# Patient Record
Sex: MALE | Race: WHITE | HISPANIC OR LATINO | Employment: FULL TIME | URBAN - METROPOLITAN AREA
[De-identification: names, ages, dates, MRNs, and addresses within clinical notes are randomized per-mention and may not be internally consistent; named-entity substitution may affect disease eponyms.]

---

## 2018-12-06 ENCOUNTER — HOSPITAL ENCOUNTER (EMERGENCY)
Facility: HOSPITAL | Age: 43
Discharge: HOME | End: 2018-12-06
Attending: EMERGENCY MEDICINE | Admitting: EMERGENCY MEDICINE
Payer: COMMERCIAL

## 2018-12-06 VITALS
DIASTOLIC BLOOD PRESSURE: 83 MMHG | HEIGHT: 71 IN | TEMPERATURE: 97 F | SYSTOLIC BLOOD PRESSURE: 132 MMHG | WEIGHT: 173.19 LBS | HEART RATE: 65 BPM | OXYGEN SATURATION: 99 % | RESPIRATION RATE: 16 BRPM | BODY MASS INDEX: 24.25 KG/M2

## 2018-12-06 DIAGNOSIS — L02.419 ABSCESS OF AXILLARY REGION: Primary | ICD-10-CM

## 2018-12-06 PROCEDURE — 99283 EMERGENCY DEPT VISIT LOW MDM: CPT

## 2018-12-06 RX ORDER — CEPHALEXIN 250 MG/1
500 CAPSULE ORAL EVERY 6 HOURS SCHEDULED
Qty: 28 CAPSULE | Refills: 0 | Status: SHIPPED | OUTPATIENT
Start: 2018-12-06 | End: 2018-12-13

## 2018-12-06 RX ORDER — ACETAMINOPHEN 325 MG/1
650 TABLET ORAL ONCE
Status: COMPLETED | OUTPATIENT
Start: 2018-12-06 | End: 2018-12-06

## 2018-12-06 RX ORDER — OXYCODONE HYDROCHLORIDE AND ACETAMINOPHEN 5; 325 MG/1; MG/1
1 TABLET ORAL EVERY 4 HOURS PRN
Qty: 5 TABLET | Refills: 0 | Status: SHIPPED | OUTPATIENT
Start: 2018-12-06 | End: 2018-12-16

## 2018-12-06 RX ORDER — CEPHALEXIN 500 MG/1
500 CAPSULE ORAL ONCE
Status: COMPLETED | OUTPATIENT
Start: 2018-12-06 | End: 2018-12-06

## 2018-12-06 RX ADMIN — ACETAMINOPHEN 650 MG: 325 TABLET ORAL at 03:52

## 2018-12-06 RX ADMIN — CEPHALEXIN 500 MG: 500 CAPSULE ORAL at 03:52

## 2018-12-06 NOTE — DISCHARGE INSTRUCTIONS
Absceso   LO QUE NECESITA SABER:   Desi compresa tibia puede ayudar a que el absceso drene  Faust médico hará desi incisión en el absceso para que pueda drenar  Usted puede necesitar cirugía para extirpar un absceso en las kodak o los glúteos  INSTRUCCIONES SOBRE EL FAWN HOSPITALARIA:   Regrese a la israel de emergencias si:   · El área alrededor del absceso se pone muy dolorosa, caliente o tiene manchas camargo  · Usted tiene fiebre o escalofríos  · Faust corazón está latiendo mas rápido de lo normal      · Se siente desmayar o confundido  Pregúntele a faust Carmen Broom vitaminas y minerales son adecuados para usted  · Faust absceso se hace más nhan o no mejora  · El absceso se vuelve a formar  · Usted tiene preguntas o inquietudes acerca de faust condición o cuidado  Medicamentos:  Es posible que usted necesite alguno de los siguientes:  · Antibióticos  ayudan a tratar desi infección bacteriana  · El acetaminofén  [place] el dolor y baja la fiebre  Está disponible sin receta médica  Pregunte la cantidad y la frecuencia con que debe tomarlos  [address]  El acetaminofén puede causar daño en el hígado cuando no se gisela de forma correcta  · AINEs (Analgésicos antiinflamatorios no esteroides) logan el ibuprofeno, ayudan a disminuir la inflamación, el dolor y la [place]  Rosalia medicamento esta disponible con o sin desi receta médica  Los AINEs pueden causar sangrado estomacal o problemas renales en ciertas personas  Si usted gisela un medicamento anticoagulante, siempre pregúntele a faust médico si los ELMER son seguros para usted  Siempre zoila la etiqueta de rosalia medicamento y [place] instrucciones  · [place] claudine medicamentos logan se le haya indicado  Consulte con faust médico si usted piotr que faust medicamento no le está ayudando o si presenta efectos secundarios  Infórmele si es alérgico a cualquier medicamento  Mantenga desi lista actualizada de los [place], las vitaminas y los productos herbales que gisela  Incluya los siguientes datos de los medicamentos: cantidad, frecuencia y motivo de administración  Traiga con usted la lista o los envases de la píldoras a claudine citas de seguimiento  Lleve la lista de los medicamentos con usted en michelle de desi emergencia  Cuidados personales:   · Aplique desi compresa tibia en el absceso  [place] ayudará a que el absceso se laura y drene  Moje desi toallita en agua tibia kelby no caliente  Aplicar la compresa bradley 10 minutos  [place] esto 4 veces al día  No  presione el absceso ni trate de abrirlo con [place]  Puede empujar las bacterias de manera más profunda hacia la [place]  · No compartir con nadie faust ropa, toallas o sábanas  [place] puede propagar la infección a otros  · Lávese las kodak frecuentemente  [place] ayudará a prevenir la propagación de gérmenes  Use jabón y agua o un ungüento con base de alcohol  Cuidar la herida después del drenaje:   · Siga las instrucciones de faust médico sobre el cuidado de claudine heridas  Si faust médico dice que [employer], retire cuidadosamente el vendaje y la gasa  Puede que necesite empapar la gasa para poder sacarla de la herida  Limpié la herida y Александр a faust alrededor según indicaciones  Seque el área y póngase desi venda nueva y limpia  [place]ambie claudine vendajes cuando se mojen o ensucien  · Pregúntele a faust médico cómo cambiarse la gasa en faust herida  Cuente el número de apósitos de gasa que se colocan dentro de faust herida  No ponga demasiada gasa en la herida  No aprete demasiado la herida con la gasa  Acuda dentro de 1 o 3 días a la consulta de control con faust médico:  Es probable que usted necesite que le remuevan claudine compresas o le cambien las vendas  Anote claudine preguntas para que se acuerde de hacerlas bradley claudine visitas  © 2017 [address] Information is for End User's use only and may not be sold, redistributed or otherwise used for commercial purposes   All illustrations and images included in CareNotes® are the copyrighted property of A D A M , Inc  or Stewart Abreu  Esta información es sólo para uso en educación  Faust intención no es darle un consejo médico sobre enfermedades o tratamientos  Colsulte con faust Charna Heckler farmacéutico antes de seguir cualquier régimen médico para saber si es seguro y efectivo para usted  Warm compresses twice a day to affected area

## 2018-12-06 NOTE — ED PROVIDER NOTES
History  Chief Complaint   Patient presents with    Abscess     38 y/o male c/o L axilla swelling and pain which first noticed two days ago  Patient states that he has been shaving his axillary hair  He states that the pain got worse tonight at work  He denies any history of hidradenitis  No drainage noted upon exam               None       History reviewed  No pertinent past medical history  History reviewed  No pertinent surgical history  History reviewed  No pertinent family history  I have reviewed and agree with the history as documented  Social History   Substance Use Topics    Smoking status: Never Smoker    Smokeless tobacco: Never Used    Alcohol use No        Review of Systems   Skin: Positive for wound  All other systems reviewed and are negative  Physical Exam  Physical Exam   Constitutional: He is oriented to person, place, and time  He appears well-developed and well-nourished  HENT:   Head: Normocephalic and atraumatic  Eyes: Pupils are equal, round, and reactive to light  EOM are normal    Neck: Normal range of motion  Neck supple  No JVD present  No tracheal deviation present  No thyromegaly present  Cardiovascular: Normal rate, regular rhythm and normal heart sounds  Pulmonary/Chest: Effort normal and breath sounds normal    L axillary swelling with induration and tenderness to palpation  Non-fluctuant and without surrounding erythema  No signs of necrosis  Abdominal: Soft  Bowel sounds are normal    Musculoskeletal: Normal range of motion  Neurological: He is alert and oriented to person, place, and time  Skin: Skin is warm and dry  Capillary refill takes less than 2 seconds  Psychiatric: He has a normal mood and affect  Vitals reviewed        Vital Signs  ED Triage Vitals [12/06/18 0323]   Temperature Pulse Respirations Blood Pressure SpO2   (!) 97 °F (36 1 °C) 65 16 132/83 99 %      Temp Source Heart Rate Source Patient Position - Orthostatic VS BP Location FiO2 (%)   Oral Monitor Sitting Left arm --      Pain Score       Worst Possible Pain           Vitals:    12/06/18 0323   BP: 132/83   Pulse: 65   Patient Position - Orthostatic VS: Sitting       Visual Acuity      ED Medications  Medications   cephalexin (KEFLEX) capsule 500 mg (not administered)   acetaminophen (TYLENOL) tablet 650 mg (not administered)       Diagnostic Studies  Results Reviewed     None                 No orders to display              Procedures  Procedures       Phone Contacts  ED Phone Contact    ED Course                               MDM  CritCare Time    Disposition  Final diagnoses:   Abscess of axillary region     Time reflects when diagnosis was documented in both MDM as applicable and the Disposition within this note     Time User Action Codes Description Comment    12/6/2018  3:43 AM Jacques Akbar Add [L02 419] Abscess of axillary region       ED Disposition     ED Disposition Condition Comment    Discharge  Sanket León discharge to home/self care      Condition at discharge: Good        Follow-up Information     Follow up With Specialties Details Why Contact Info Additional Information    [address] [facility] Medicine Go to As needed [address], [address] [facility] [address]  [address] [place] [address], [address], [address], [place], [address]          Patient's Medications   Discharge Prescriptions    CEPHALEXIN (KEFLEX) 250 MG CAPSULE    Take 2 capsules (500 mg total) by mouth every 6 (six) hours for 7 days       Start Date: 12/6/2018 End Date: 12/13/2018       Order Dose: 500 mg       Quantity: 28 capsule    Refills: 0    OXYCODONE-ACETAMINOPHEN (PERCOCET) 5-325 MG PER TABLET    Take 1 tablet by mouth every 4 (four) hours as needed for moderate pain for up to 10 days Max Daily Amount: 6 tablets       Start Date: 12/6/2018 End Date: 12/16/2018 Order Dose: 1 tablet       Quantity: 5 tablet    Refills: 0     No discharge procedures on file      ED Provider  Electronically Signed by           Madalyn Zamora DO  12/06/18 4479

## 2019-10-07 ENCOUNTER — HOSPITAL ENCOUNTER (EMERGENCY)
Facility: HOSPITAL | Age: 44
Discharge: HOME | End: 2019-10-07
Attending: EMERGENCY MEDICINE | Admitting: EMERGENCY MEDICINE
Payer: COMMERCIAL

## 2019-10-07 VITALS
TEMPERATURE: 96.6 F | WEIGHT: 170.86 LBS | SYSTOLIC BLOOD PRESSURE: 117 MMHG | OXYGEN SATURATION: 99 % | BODY MASS INDEX: 23.83 KG/M2 | RESPIRATION RATE: 16 BRPM | DIASTOLIC BLOOD PRESSURE: 66 MMHG | HEART RATE: 63 BPM

## 2019-10-07 DIAGNOSIS — M54.50 ACUTE RIGHT-SIDED LOW BACK PAIN WITHOUT SCIATICA: Primary | ICD-10-CM

## 2019-10-07 PROCEDURE — 99284 EMERGENCY DEPT VISIT MOD MDM: CPT | Performed by: NURSE PRACTITIONER

## 2019-10-07 PROCEDURE — 99282 EMERGENCY DEPT VISIT SF MDM: CPT

## 2019-10-07 RX ORDER — LIDOCAINE 50 MG/G
1 PATCH TOPICAL ONCE
Status: DISCONTINUED | OUTPATIENT
Start: 2019-10-07 | End: 2019-10-07 | Stop reason: HOSPADM

## 2019-10-07 RX ORDER — METHOCARBAMOL 500 MG/1
500 TABLET, FILM COATED ORAL 3 TIMES DAILY PRN
Qty: 20 TABLET | Refills: 0 | Status: SHIPPED | OUTPATIENT
Start: 2019-10-07 | End: 2020-08-31 | Stop reason: ALTCHOICE

## 2019-10-07 RX ORDER — NAPROXEN 500 MG/1
500 TABLET ORAL 2 TIMES DAILY WITH MEALS
Qty: 30 TABLET | Refills: 0 | Status: SHIPPED | OUTPATIENT
Start: 2019-10-07 | End: 2020-08-31 | Stop reason: ALTCHOICE

## 2019-10-07 RX ADMIN — LIDOCAINE 1 PATCH: 50 PATCH CUTANEOUS at 17:06

## 2019-10-07 NOTE — DISCHARGE INSTRUCTIONS
You are to call and find a family doctor for follow up  You are being treated for muscle pain  You have been given naproxen for pain (do not take advil or motrin with this) you have also been prescribed robaxin for muscle spasms - do not drink alcohol or drive machinery while taking this medication      You may purchase over the counter SALONPAS for pain as well

## 2019-10-07 NOTE — ED PROVIDER NOTES
History  Chief Complaint   Patient presents with    Back Pain     Patient states he has had back pain since Saturday; Patient cannot recall any trauma, but did mild activity, howevery nothing he can recall that was severe     This is a 40year old male who states twisted his right back on Saturday  He states that the pain is intermittent  He took advil 2 hours ago with some relief  Denies injury  History provided by:  Medical records and patient   used: No        None       Past Medical History:   Diagnosis Date    Known health problems: none        Past Surgical History:   Procedure Laterality Date    NO PAST SURGERIES         History reviewed  No pertinent family history  I have reviewed and agree with the history as documented  Social History     Tobacco Use    Smoking status: Never Smoker    Smokeless tobacco: Never Used   Substance Use Topics    Alcohol use: Yes    Drug use: No        Review of Systems   Constitutional: Negative  HENT: Negative  Eyes: Negative  Respiratory: Negative  Cardiovascular: Negative  Gastrointestinal: Negative  Endocrine: Negative  Genitourinary: Negative  Musculoskeletal: Positive for back pain  Skin: Negative  Allergic/Immunologic: Negative  Hematological: Negative  Psychiatric/Behavioral: Negative  Physical Exam  Physical Exam   Constitutional: He is oriented to person, place, and time  He appears well-developed and well-nourished  No distress  HENT:   Head: Normocephalic and atraumatic  Eyes: Pupils are equal, round, and reactive to light  EOM are normal    Neck: Normal range of motion  Neck supple  Cardiovascular: Normal rate, regular rhythm and normal heart sounds  Pulmonary/Chest: Effort normal and breath sounds normal    Abdominal: Soft  Bowel sounds are normal    Musculoskeletal: He exhibits tenderness  LROM due to pain   TTP right of lumbar spine paraspinal muscle      Neurological: He is alert and oriented to person, place, and time  Skin: Skin is warm and dry  Capillary refill takes less than 2 seconds  He is not diaphoretic  Psychiatric: He has a normal mood and affect  His behavior is normal  Judgment and thought content normal    Nursing note and vitals reviewed  Vital Signs  ED Triage Vitals [10/07/19 1638]   Temperature Pulse Respirations Blood Pressure SpO2   (!) 96 6 °F (35 9 °C) 63 16 117/66 99 %      Temp Source Heart Rate Source Patient Position - Orthostatic VS BP Location FiO2 (%)   Tympanic Monitor Sitting Left arm --      Pain Score       --           Vitals:    10/07/19 1638   BP: 117/66   Pulse: 63   Patient Position - Orthostatic VS: Sitting         Visual Acuity      ED Medications  Medications   lidocaine (LIDODERM) 5 % patch 1 patch (1 patch Topical Medication Applied 10/7/19 1706)       Diagnostic Studies  Results Reviewed     None                 No orders to display              Procedures  Procedures       ED Course                               MDM    Disposition  Final diagnoses:   Acute right-sided low back pain without sciatica     Time reflects when diagnosis was documented in both MDM as applicable and the Disposition within this note     Time User Action Codes Description Comment    10/7/2019  5:06 PM Madalyn Liriano Add [M54 5] Acute right-sided low back pain without sciatica       ED Disposition     ED Disposition Condition Date/Time Comment    Discharge Stable Mon Oct 7, 2019  5:06 PM [facility] discharge to home/self care              Follow-up Information     Follow up With Specialties Details Why Contact Info Additional [address] Family Medicine Schedule an appointment as soon as possible for a visit in 2 days  [address], [address] [facility] [address]  [address], [address], [address] [place], [place], [address] [address] Emergency Department Emergency Medicine  If symptoms worsen [address] [address] [phone]           Patient's Medications   Discharge Prescriptions    METHOCARBAMOL (ROBAXIN) 500 MG TABLET    Take 1 tablet (500 mg total) by mouth 3 (three) times a day as needed for muscle spasms       Start Date: 10/7/2019 End Date: --       Order Dose: 500 mg       Quantity: 20 tablet    Refills: 0    NAPROXEN (NAPROSYN) 500 MG TABLET    Take 1 tablet (500 mg total) by mouth 2 (two) times a day with meals       Start Date: 10/7/2019 End Date: --       Order Dose: 500 mg       Quantity: 30 tablet    Refills: 0     No discharge procedures on file      ED Provider  Electronically Signed by           Helder Sanfrod  10/07/19 4376

## 2020-08-31 ENCOUNTER — OFFICE VISIT (OUTPATIENT)
Dept: URGENT CARE | Facility: MEDICAL CENTER | Age: 45
End: 2020-08-31
Payer: COMMERCIAL

## 2020-08-31 VITALS
OXYGEN SATURATION: 99 % | WEIGHT: 170 LBS | HEART RATE: 56 BPM | TEMPERATURE: 97.8 F | HEIGHT: 70 IN | BODY MASS INDEX: 24.34 KG/M2 | RESPIRATION RATE: 20 BRPM

## 2020-08-31 DIAGNOSIS — Z11.59 SPECIAL SCREENING EXAMINATION FOR UNSPECIFIED VIRAL DISEASE: Primary | ICD-10-CM

## 2020-08-31 PROCEDURE — U0003 INFECTIOUS AGENT DETECTION BY NUCLEIC ACID (DNA OR RNA); SEVERE ACUTE RESPIRATORY SYNDROME CORONAVIRUS 2 (SARS-COV-2) (CORONAVIRUS DISEASE [COVID-19]), AMPLIFIED PROBE TECHNIQUE, MAKING USE OF HIGH THROUGHPUT TECHNOLOGIES AS DESCRIBED BY CMS-2020-01-R: HCPCS | Performed by: PHYSICIAN ASSISTANT

## 2020-08-31 NOTE — PATIENT INSTRUCTIONS
Novel Coronavirus   AMBULATORY CARE:   The novel coronavirus (nCoV)  is a new strain (type) of coronavirus  Coronaviruses often cause mild respiratory (lung) infections, such as the common cold  They can also cause more severe infections  Examples include acute respiratory syndrome (SARS), Middle East respiratory syndrome (MERS), pneumonia, and bronchitis  The nCoV can cause more severe symptoms than earlier coronaviruses  The nCoV was first found in individuals in part of [place] at the end of 2019  The virus is spreading as people who are infected travel to other parts of the world  Signs and symptoms of nCoV  can take 2 to 14 days to develop and range from mild to severe:  · Fever    · Cough    · Shortness of breath or trouble breathing    · Pneumonia (in severe cases)    Call your local emergency number (911 in the [address] [place] [address],[address]) for any of the following:  Tell the  you may have nCoV  This will help anyone in the ambulance stay safe, and to call ahead to the hospital   · You have sudden shortness of breath  · You have a fast heartbeat or chest pain  Seek care immediately if:   · You feel so dizzy that you have trouble standing up  · Your lips and fingernails turn blue  Call your doctor if:   · You have a fever over 102ºF (39ºC)  · Your sore throat gets worse or you see white or yellow spots in your throat  · Your symptoms get worse after 3 to 5 days or your cold is not better in 14 days  · You have a rash anywhere on your skin  · You have large, tender lumps in your neck  · You have thick, green, or yellow drainage from your nose  · You cough up thick yellow, green, or bloody mucus  · You are vomiting for more than 24 hours and cannot keep fluids down  · You have a bad earache  · You have questions or concerns about your condition or care  How nCoV spreads: It is not known for sure how the virus spreads   The virus may spread in droplets when an infected person coughs or sneezes  Others become infected by breathing in the virus or getting the virus in their eyes  The virus can also possibly spread if a person touches certain animals, such as in a live market  If you develop symptoms of nCoV,  you may need to be checked  Call your doctor if you traveled within the past 14 days to an area where nCoV is active  Call your doctor if you have close contact with someone else who did  Your doctor will tell you what to do  He or she will need to take precautions in the office to protect staff members and other patients  Your doctor will take samples from your airway  The samples have to be sent to the Centers for Disease Control and Prevention (CDC) to be tested  What to do if you have nCoV:  No treatment is available for nCoV  Medicine may be given to help relieve your cough or fever, or to help you breathe more easily  Severe nCoV may need to be treated in the hospital  In the hospital, you may need breathing treatment or support, such as extra oxygen  The following can help you prevent spreading nCoV to others  Have anyone you are caring for who has nCoV also use the guidelines  · Limit close contact with others  Stay in a different room, or sleep in a separate bed  Remind others to stay at least 6 feet (2 meters) away from you while you are sick  Only go out of your house if you are going to a medical appointment  While you are recovering, always call the office of any healthcare provider you seeing  The provider will need to prepare for your arrival to keep others safe  · Wear a medical mask when you are around others  A medical mask will help prevent you from spreading the virus  You can ask others around you to wear a medical mask if you are not able to wear one  · Cover your mouth and nose to sneeze or cough  Sneeze and cough into a tissue that covers your mouth and nose  Use the bend of our arm if you do not have a tissue  Throw the tissue away in a trash can right away  Then wash your hands well with soap and water  Use hand  that contains alcohol if you cannot wash your hands  · Wash your hands often  You and everyone in your home must wash your hands throughout the day  Use soap and water  Use germ-killing gel if soap and water are not available  Do not touch your eyes or mouth if you have not washed your hands  · Do not share items with other people  Do not share dishes, towels, or other items with anyone  Always wash items after you use them  · Clean frequently touched surfaces often  Use household  or bleach diluted with water to clean counters, doorknobs, toilet seats, and other surfaces  · Do not handle live animals  You may be able to spread nCoV to animals, including pets  Until more is known, it is best not to touch, play with, or handle live animals while you are sick  Help relieve mild symptoms:   · Drink more liquids as directed  Liquids will help thin and loosen mucus so you can cough it up  Liquids will also help prevent dehydration  Liquids that help prevent dehydration include water, fruit juice, and broth  Do not drink liquids that contain caffeine  Caffeine can increase your risk for dehydration  Ask your healthcare provider how much liquid to drink each day  · Soothe a sore throat  Gargle with warm salt water  This helps your sore throat feel better  Make salt water by dissolving ¼ teaspoon salt in 1 cup warm water  You may also suck on hard candy or throat lozenges  You may use a sore throat spray  · Use a humidifier or vaporizer  Use a cool mist humidifier or a vaporizer to increase air moisture in your home  This may make it easier for you to breathe and help decrease your cough  · Use saline nasal drops as directed  These help relieve congestion  · Apply petroleum-based jelly around the outside of your nostrils  This can decrease irritation from blowing your nose  · Do not smoke    Nicotine and other chemicals in cigarettes and cigars can make your symptoms worse  They can also cause infections such as bronchitis or pneumonia  Ask your healthcare provider for information if you currently smoke and need help to quit  E-cigarettes or smokeless tobacco still contain nicotine  Talk to your healthcare provider before you use these products  Follow up with your doctor as directed:  Write down your questions so you remember to ask them during your visits  © Copyright [address] Information is for End User's use only and may not be sold, redistributed or otherwise used for commercial purposes  All illustrations and images included in CareNotes® are the copyrighted property of A D A M , Inc  or [address] [place] [address]  The above information is an  only  It is not intended as medical advice for individual conditions or treatments  Talk to your doctor, nurse or pharmacist before following any medical regimen to see if it is safe and effective for you

## 2020-08-31 NOTE — PROGRESS NOTES
[facility]        NAME: Jose Enrique Gomez is a 39 y o  male  : 1975    MRN: [id number]  DATE: 2020  TIME: 6:48 PM    Assessment and Plan   Special screening examination for unspecified viral disease [Z11 59]  1  Special screening examination for unspecified viral disease  Novel Coronavirus (COVID-19), PCR [facility] - Office Collection         Patient Instructions       Follow up with PCP in 3-5 days  Proceed to  ER if symptoms worsen  Chief Complaint     Chief Complaint   Patient presents with    COVID-19     Patient is traveling to the  on sat and needs testing  patient denies any symptoms         History of Present Illness       60-year-old male presents for COVID testing  Patient is going on trip and needs to be tested prior to leaving  Currently asymptomatic  No exposures noted  Other   This is a new problem  The current episode started today  The problem occurs constantly  The problem has been unchanged  Pertinent negatives include no abdominal pain, chills, congestion, coughing, fatigue, fever, myalgias, nausea, rash, sore throat, visual change or vomiting  Nothing aggravates the symptoms  He has tried nothing for the symptoms  The treatment provided no relief  Review of Systems   Review of Systems   Constitutional: Negative  Negative for chills, fatigue and fever  HENT: Negative  Negative for congestion and sore throat  Respiratory: Negative  Negative for cough  Cardiovascular: Negative  Gastrointestinal: Negative  Negative for abdominal pain, nausea and vomiting  Musculoskeletal: Negative  Negative for myalgias  Skin: Negative  Negative for rash  Neurological: Negative  Current Medications     No current outpatient medications on file      Current Allergies     Allergies as of 2020    (No Known Allergies)            The following portions of the patient's history were reviewed and updated as appropriate: allergies, current medications, past family history, past medical history, past social history, past surgical history and problem list      Past Medical History:   Diagnosis Date    Known health problems: none        Past Surgical History:   Procedure Laterality Date    NO PAST SURGERIES         History reviewed  No pertinent family history  Medications have been verified  Objective   Pulse 56   Temp 97 8 °F (36 6 °C)   Resp 20   Ht 5' 10" (1 778 m)   Wt 77 1 kg (170 lb)   SpO2 99%   BMI 24 39 kg/m²        Physical Exam     Physical Exam  Vitals signs and nursing note reviewed  Constitutional:       General: He is not in acute distress  Appearance: He is well-developed  HENT:      Head: Normocephalic and atraumatic  Right Ear: Hearing, tympanic membrane, ear canal and external ear normal       Left Ear: Hearing, tympanic membrane, ear canal and external ear normal       Nose: Nose normal       Mouth/Throat:      Pharynx: Uvula midline  No oropharyngeal exudate  Eyes:      General:         Right eye: No discharge  Left eye: No discharge  Conjunctiva/sclera: Conjunctivae normal    Neck:      Musculoskeletal: Normal range of motion and neck supple  Cardiovascular:      Rate and Rhythm: Normal rate and regular rhythm  Heart sounds: Normal heart sounds  No murmur  Pulmonary:      Effort: Pulmonary effort is normal  No respiratory distress  Breath sounds: Normal breath sounds  No wheezing or rales  Abdominal:      General: Bowel sounds are normal       Palpations: Abdomen is soft  Tenderness: There is no abdominal tenderness  Musculoskeletal: Normal range of motion  Lymphadenopathy:      Cervical: No cervical adenopathy  Skin:     General: Skin is warm and dry  Neurological:      Mental Status: He is alert and oriented to person, place, and time

## 2020-09-02 ENCOUNTER — TELEPHONE (OUTPATIENT)
Dept: OTHER | Facility: OTHER | Age: 45
End: 2020-09-02

## 2020-09-02 LAB — SARS-COV-2 RNA SPEC QL NAA+PROBE: NOT DETECTED

## 2020-09-02 NOTE — TELEPHONE ENCOUNTER
The patient was called for notification of a test result for COVID-19  The patient did not answer the phone and a voicemail was left in [place] requesting a call back to [phone], Option 7

## 2020-09-03 ENCOUNTER — TELEPHONE (OUTPATIENT)
Dept: URGENT CARE | Facility: CLINIC | Age: 45
End: 2020-09-03

## 2022-05-12 ENCOUNTER — HOSPITAL ENCOUNTER (EMERGENCY)
Facility: HOSPITAL | Age: 47
Discharge: HOME | End: 2022-05-12
Attending: EMERGENCY MEDICINE
Payer: COMMERCIAL

## 2022-05-12 VITALS
SYSTOLIC BLOOD PRESSURE: 121 MMHG | HEART RATE: 61 BPM | WEIGHT: 184.3 LBS | OXYGEN SATURATION: 98 % | TEMPERATURE: 97.3 F | DIASTOLIC BLOOD PRESSURE: 72 MMHG | RESPIRATION RATE: 18 BRPM | BODY MASS INDEX: 26.44 KG/M2

## 2022-05-12 DIAGNOSIS — J11.1 INFLUENZA-LIKE ILLNESS: Primary | ICD-10-CM

## 2022-05-12 PROCEDURE — 96372 THER/PROPH/DIAG INJ SC/IM: CPT

## 2022-05-12 PROCEDURE — 99284 EMERGENCY DEPT VISIT MOD MDM: CPT | Performed by: PHYSICIAN ASSISTANT

## 2022-05-12 PROCEDURE — 99283 EMERGENCY DEPT VISIT LOW MDM: CPT

## 2022-05-12 RX ORDER — LIDOCAINE HYDROCHLORIDE 20 MG/ML
15 SOLUTION OROPHARYNGEAL ONCE
Status: COMPLETED | OUTPATIENT
Start: 2022-05-12 | End: 2022-05-12

## 2022-05-12 RX ORDER — KETOROLAC TROMETHAMINE 30 MG/ML
15 INJECTION, SOLUTION INTRAMUSCULAR; INTRAVENOUS ONCE
Status: COMPLETED | OUTPATIENT
Start: 2022-05-12 | End: 2022-05-12

## 2022-05-12 RX ORDER — ACETAMINOPHEN 500 MG
500 TABLET ORAL EVERY 6 HOURS PRN
Qty: 30 TABLET | Refills: 0 | Status: SHIPPED | OUTPATIENT
Start: 2022-05-12

## 2022-05-12 RX ORDER — ALUMINUM HYDROXIDE, MAGNESIUM HYDROXIDE, SIMETHICONE 400; 400; 40 MG/10ML; MG/10ML; MG/10ML
15 SUSPENSION ORAL
Qty: 150 ML | Refills: 0 | Status: SHIPPED | OUTPATIENT
Start: 2022-05-12

## 2022-05-12 RX ORDER — FLUTICASONE PROPIONATE 50 MCG
1 SPRAY, SUSPENSION (ML) NASAL DAILY
Qty: 16 G | Refills: 0 | Status: SHIPPED | OUTPATIENT
Start: 2022-05-12

## 2022-05-12 RX ORDER — GUAIFENESIN/DEXTROMETHORPHAN 100-10MG/5
5 SYRUP ORAL 3 TIMES DAILY PRN
Qty: 118 ML | Refills: 0 | Status: SHIPPED | OUTPATIENT
Start: 2022-05-12 | End: 2022-05-22

## 2022-05-12 RX ORDER — ONDANSETRON 4 MG/1
4 TABLET, ORALLY DISINTEGRATING ORAL ONCE
Status: COMPLETED | OUTPATIENT
Start: 2022-05-12 | End: 2022-05-12

## 2022-05-12 RX ORDER — IBUPROFEN 400 MG/1
400 TABLET ORAL EVERY 6 HOURS PRN
Qty: 12 TABLET | Refills: 0 | Status: SHIPPED | OUTPATIENT
Start: 2022-05-12

## 2022-05-12 RX ORDER — MAGNESIUM HYDROXIDE/ALUMINUM HYDROXICE/SIMETHICONE 120; 1200; 1200 MG/30ML; MG/30ML; MG/30ML
30 SUSPENSION ORAL ONCE
Status: COMPLETED | OUTPATIENT
Start: 2022-05-12 | End: 2022-05-12

## 2022-05-12 RX ORDER — ONDANSETRON 4 MG/1
4 TABLET, ORALLY DISINTEGRATING ORAL EVERY 8 HOURS PRN
Qty: 20 TABLET | Refills: 0 | Status: SHIPPED | OUTPATIENT
Start: 2022-05-12 | End: 2022-05-22

## 2022-05-12 RX ADMIN — LIDOCAINE HYDROCHLORIDE 15 ML: 20 SOLUTION ORAL at 12:13

## 2022-05-12 RX ADMIN — ALUMINUM HYDROXIDE, MAGNESIUM HYDROXIDE, AND SIMETHICONE 30 ML: 200; 200; 20 SUSPENSION ORAL at 12:13

## 2022-05-12 RX ADMIN — DIPHENHYDRAMINE HYDROCHLORIDE 25 MG: 25 LIQUID ORAL at 12:12

## 2022-05-12 RX ADMIN — ONDANSETRON 4 MG: 4 TABLET, ORALLY DISINTEGRATING ORAL at 12:08

## 2022-05-12 RX ADMIN — KETOROLAC TROMETHAMINE 15 MG: 30 INJECTION, SOLUTION INTRAMUSCULAR; INTRAVENOUS at 12:09

## 2022-05-12 NOTE — ED PROVIDER NOTES
History  Chief Complaint   Patient presents with    Generalized Body Aches     Body aches for 2 days  States he was tested at work for covid 1 hr pta and it was negative     Patient is a 55-year-old male presenting today for evaluation of flu symptoms  Patient reports that for the past 2 days has been having body aches, nausea, acid reflux, congestion, headaches, subjective fevers and chills  Patient reports at work he was able to obtain a COVID and flu test which was noted to be negative for both  Patient is declining this testing today requesting medications solely to assist with his symptoms  Patient states he does not have a family care provider and does not have medical conditions to his knowledge  History provided by:  Patient   used: No    Generalized Body Aches  Location:  Body  Quality:  Aching  Severity:  Moderate  Onset quality:  Gradual  Timing:  Constant  Progression:  Unchanged  Chronicity:  New  Context:  Nausea, headache, congestion  Relieved by:  None  Worsened by:  None  Ineffective treatments:  Tylenol  Associated symptoms: congestion, fatigue, myalgias and nausea    Associated symptoms: no abdominal pain, no chest pain, no ear pain, no fever, no rash, no rhinorrhea, no shortness of breath, no sore throat and no vomiting        None       Past Medical History:   Diagnosis Date    Known health problems: none        Past Surgical History:   Procedure Laterality Date    NO PAST SURGERIES         History reviewed  No pertinent family history  I have reviewed and agree with the history as documented      E-Cigarette/Vaping    E-Cigarette Use Never User      E-Cigarette/Vaping Substances     Social History     Tobacco Use    Smoking status: Never Smoker    Smokeless tobacco: Never Used   Vaping Use    Vaping Use: Never used   Substance Use Topics    Alcohol use: Yes     Comment: rare    Drug use: No       Review of Systems   Constitutional: Positive for chills and fatigue  Negative for fever  HENT: Positive for congestion and postnasal drip  Negative for ear pain, rhinorrhea and sore throat  Eyes: Negative for redness  Respiratory: Negative for chest tightness and shortness of breath  Cardiovascular: Negative for chest pain and palpitations  Gastrointestinal: Positive for nausea  Negative for abdominal pain and vomiting  Genitourinary: Negative for dysuria and hematuria  Musculoskeletal: Positive for myalgias  Skin: Negative for rash  Neurological: Negative for dizziness, syncope, light-headedness and numbness  Physical Exam  Physical Exam  Vitals and nursing note reviewed  Constitutional:       Appearance: Normal appearance  He is well-developed  HENT:      Head: Normocephalic and atraumatic  Eyes:      General: No scleral icterus  Pupils: Pupils are equal, round, and reactive to light  Cardiovascular:      Rate and Rhythm: Normal rate and regular rhythm  Pulses: Normal pulses  Pulmonary:      Effort: Pulmonary effort is normal  No respiratory distress  Breath sounds: No stridor  Abdominal:      General: There is no distension  Palpations: There is no mass  Musculoskeletal:      Cervical back: Normal range of motion  Skin:     General: Skin is warm and dry  Capillary Refill: Capillary refill takes less than 2 seconds  Coloration: Skin is not jaundiced  Neurological:      Mental Status: He is alert and oriented to person, place, and time        Gait: Gait normal    Psychiatric:         Mood and Affect: Mood normal          Vital Signs  ED Triage Vitals   Temperature Pulse Respirations Blood Pressure SpO2   05/12/22 1142 05/12/22 1141 05/12/22 1141 05/12/22 1141 05/12/22 1141   (!) 97 3 °F (36 3 °C) 61 18 121/72 98 %      Temp Source Heart Rate Source Patient Position - Orthostatic VS BP Location FiO2 (%)   05/12/22 1141 05/12/22 1141 05/12/22 1141 05/12/22 1141 --   Tympanic Monitor Sitting Left arm Pain Score       --                  Vitals:    05/12/22 1141   BP: 121/72   Pulse: 61   Patient Position - Orthostatic VS: Sitting         Visual Acuity      ED Medications  Medications   ketorolac (TORADOL) injection 15 mg (15 mg Intramuscular Given 5/12/22 1209)   diphenhydrAMINE (BENADRYL) oral liquid 25 mg (25 mg Oral Given 5/12/22 1212)   Lidocaine Viscous HCl (XYLOCAINE) 2 % mucosal solution 15 mL (15 mL Swish & Swallow Given 5/12/22 1213)   aluminum-magnesium hydroxide-simethicone (MYLANTA) oral suspension 30 mL (30 mL Oral Given 5/12/22 1213)   ondansetron (ZOFRAN-ODT) dispersible tablet 4 mg (4 mg Oral Given 5/12/22 1208)       Diagnostic Studies  Results Reviewed     None                 No orders to display              Procedures  Procedures         ED Course                               SBIRT 22yo+    Flowsheet Row Most Recent Value   SBIRT (23 yo +)    In order to provide better care to our patients, we are screening all of our patients for alcohol and drug use  Would it be okay to ask you these screening questions? No Filed at: 05/12/2022 1157                    MDM  Number of Diagnoses or Management Options  Diagnosis management comments: Strict return to ED precautions discussed  Patient recommended to follow up promptly with appropriate outpatient provider  Patient and/or family members verbalizes understanding and agrees with plan  Patient is stable for discharge      Portions of the record may have been created with voice recognition software  Occasional wrong word or "sound a like" substitutions may have occurred due to the inherent limitations of voice recognition software  Read the chart carefully and recognize, using context, where substitutions have occurred          Disposition  Final diagnoses:   Influenza-like illness     Time reflects when diagnosis was documented in both MDM as applicable and the Disposition within this note     Time User Action Codes Description Comment    5/12/2022 11:58 AM Sonia Funes Add [J11 1] Influenza-like illness       ED Disposition     ED Disposition   Discharge    Condition   Good    Date/Time   Thu May 12, 2022 12:00 PM    Comment   Sanket León discharge to home/self care  Follow-up Information     Follow up With Specialties Details Why Bebeto Shabazz MD Family Medicine Schedule an appointment as soon as possible for a visit in 2 days As needed [id number] [facility] [address]            Patient's Medications   Discharge Prescriptions    ACETAMINOPHEN (TYLENOL) 500 MG TABLET    Take 1 tablet (500 mg total) by mouth every 6 (six) hours as needed for mild pain       Start Date: 5/12/2022 End Date: --       Order Dose: 500 mg       Quantity: 30 tablet    Refills: 0    ALUMINUM-MAGNESIUM HYDROXIDE-SIMETHICONE (MAALOX) 200-200-20 MG/5ML SUSP    Take 15 mL by mouth 4 (four) times a day (before meals and at bedtime)       Start Date: 5/12/2022 End Date: --       Order Dose: 15 mL       Quantity: 150 mL    Refills: 0    DEXTROMETHORPHAN-GUAIFENESIN (ROBITUSSIN DM)  MG/5 ML SYRUP    Take 5 mL by mouth as needed in the morning and 5 mL as needed at noon and 5 mL as needed in the evening (cough)  Do all this for up to 10 days  Start Date: 5/12/2022 End Date: 5/22/2022       Order Dose: 5 mL       Quantity: 118 mL    Refills: 0    FLUTICASONE (FLONASE) 50 MCG/ACT NASAL SPRAY    1 spray into each nostril in the morning         Start Date: 5/12/2022 End Date: --       Order Dose: 1 spray       Quantity: 16 g    Refills: 0    IBUPROFEN (MOTRIN) 400 MG TABLET    Take 1 tablet (400 mg total) by mouth every 6 (six) hours as needed for mild pain       Start Date: 5/12/2022 End Date: --       Order Dose: 400 mg       Quantity: 12 tablet    Refills: 0    ONDANSETRON (ZOFRAN-ODT) 4 MG DISINTEGRATING TABLET    Take 1 tablet (4 mg total) by mouth every 8 (eight) hours as needed for nausea for up to 10 days       Start Date: 5/12/2022 End Date: 5/22/2022       Order Dose: 4 mg       Quantity: 20 tablet    Refills: 0       No discharge procedures on file      PDMP Review     None          ED Provider  Electronically Signed by           Dre Herrera PA-C  05/12/22 2563

## 2022-05-12 NOTE — Clinical Note
Ml Ugarte was seen and treated in our emergency department on 5/12/2022  Diagnosis:     Ana Cristina Urrutia  may return to work on return date  He may return on this date: 05/16/2022         If you have any questions or concerns, please don't hesitate to call        Jyoti Gallegos PA-C    ______________________________           _______________          _______________  Hospital Representative                              Date                                Time

## 2022-06-10 ENCOUNTER — OFFICE VISIT (OUTPATIENT)
Dept: FAMILY MEDICINE CLINIC | Facility: CLINIC | Age: 47
End: 2022-06-10

## 2022-06-10 VITALS
SYSTOLIC BLOOD PRESSURE: 122 MMHG | HEART RATE: 89 BPM | HEIGHT: 71 IN | RESPIRATION RATE: 18 BRPM | BODY MASS INDEX: 25.31 KG/M2 | OXYGEN SATURATION: 96 % | WEIGHT: 180.8 LBS | TEMPERATURE: 98.3 F | DIASTOLIC BLOOD PRESSURE: 74 MMHG

## 2022-06-10 DIAGNOSIS — Z13.220 SCREENING CHOLESTEROL LEVEL: ICD-10-CM

## 2022-06-10 DIAGNOSIS — Z11.4 SCREENING FOR HIV (HUMAN IMMUNODEFICIENCY VIRUS): ICD-10-CM

## 2022-06-10 DIAGNOSIS — Z13.1 SCREENING FOR DIABETES MELLITUS: ICD-10-CM

## 2022-06-10 DIAGNOSIS — Z00.00 ENCOUNTER FOR MEDICAL EXAMINATION TO ESTABLISH CARE: Primary | ICD-10-CM

## 2022-06-10 DIAGNOSIS — Z11.59 NEED FOR HEPATITIS C SCREENING TEST: ICD-10-CM

## 2022-06-10 DIAGNOSIS — Z12.11 SCREEN FOR COLON CANCER: ICD-10-CM

## 2022-06-10 PROBLEM — M67.441 GANGLION CYST OF FINGER OF RIGHT HAND: Status: ACTIVE | Noted: 2022-06-10

## 2022-06-10 PROCEDURE — 3725F SCREEN DEPRESSION PERFORMED: CPT | Performed by: FAMILY MEDICINE

## 2022-06-10 PROCEDURE — 3008F BODY MASS INDEX DOCD: CPT | Performed by: FAMILY MEDICINE

## 2022-06-10 PROCEDURE — 1036F TOBACCO NON-USER: CPT | Performed by: FAMILY MEDICINE

## 2022-06-10 PROCEDURE — 99214 OFFICE O/P EST MOD 30 MIN: CPT | Performed by: FAMILY MEDICINE

## 2022-06-10 NOTE — ASSESSMENT & PLAN NOTE
With onset one year prior - patient reports intermittent pain with certain activities   Patient works as a  and reports repetitive motion of wrists throughout the day    - Counseled patient on prognosis and benign nature of diagnosis  - Will consider Diclofenac 75 mg BID PRN with meals if symptoms worsen - patient deferred for now   - Will consider referral to hand surgeon for removal if symptoms persist or worsen

## 2022-06-10 NOTE — ASSESSMENT & PLAN NOTE
-CRC screening: No personal or family history of colon cancer or colon polyps  Referral to GI for colonoscopy as per CDC guidelines  -CVS screening: Patient denies any exertional chest pain, dyspnea, palpitations, syncope, orthopnea, edema or paroxysmal nocturnal dyspnea  -DM screening: No polyuria, polydipsia, blurry vision, chest pain, dyspnea or claudication  No foot burning, numbness or pain   -No personal or family history of skin cancers or melanoma

## 2022-07-13 ENCOUNTER — OFFICE VISIT (OUTPATIENT)
Dept: FAMILY MEDICINE CLINIC | Facility: CLINIC | Age: 47
End: 2022-07-13

## 2022-07-13 VITALS
TEMPERATURE: 97.4 F | HEART RATE: 62 BPM | RESPIRATION RATE: 18 BRPM | WEIGHT: 184 LBS | SYSTOLIC BLOOD PRESSURE: 116 MMHG | HEIGHT: 71 IN | DIASTOLIC BLOOD PRESSURE: 78 MMHG | OXYGEN SATURATION: 97 % | BODY MASS INDEX: 25.76 KG/M2

## 2022-07-13 DIAGNOSIS — Z12.11 ENCOUNTER FOR SCREENING COLONOSCOPY: Primary | ICD-10-CM

## 2022-07-13 PROBLEM — Z11.4 SCREENING FOR HIV (HUMAN IMMUNODEFICIENCY VIRUS): Status: RESOLVED | Noted: 2022-06-10 | Resolved: 2022-07-13

## 2022-07-13 PROBLEM — Z00.00 ENCOUNTER FOR MEDICAL EXAMINATION TO ESTABLISH CARE: Status: RESOLVED | Noted: 2022-06-10 | Resolved: 2022-07-13

## 2022-07-13 PROBLEM — Z12.5 SCREENING PSA (PROSTATE SPECIFIC ANTIGEN): Status: ACTIVE | Noted: 2022-06-10

## 2022-07-13 PROCEDURE — 3008F BODY MASS INDEX DOCD: CPT | Performed by: FAMILY MEDICINE

## 2022-07-13 PROCEDURE — 99213 OFFICE O/P EST LOW 20 MIN: CPT | Performed by: FAMILY MEDICINE

## 2022-07-13 PROCEDURE — 1036F TOBACCO NON-USER: CPT | Performed by: FAMILY MEDICINE

## 2022-07-13 NOTE — ASSESSMENT & PLAN NOTE
No personal or family history of colon cancer or colon polyps  Referral to GI for colonoscopy as per CDC guidelines

## 2022-07-13 NOTE — PROGRESS NOTES
Assessment/Plan:    Screening PSA (prostate specific antigen)  Patient reports concern over prostate cancer  Also reports concern over general health  Currently Asymptomatic and denies any complaints  No personal or fam hx of prostate cancer  Counseled patient on screening recommendations and via shared decision making, patient is agreeable to NOT testing at this time  Patient reminded to complete lab work ordered at previous visit    Encounter for screening colonoscopy  No personal or family history of colon cancer or colon polyps  Referral to GI for colonoscopy as per CDC guidelines  No follow-ups on file  Diagnoses and all orders for this visit:    Encounter for screening colonoscopy  -     Ambulatory Referral to Gastroenterology; Future          Subjective:     Micha Huizar is a 52 y o  male who  has a past medical history of Encounter for medical examination to establish care and Ganglion cyst of finger of right hand  who presented to the office today for concerns about his prostate  HPI    Patient reports he is concerned about having prostate cancer because some of his friends have warned him that he should get checked  Patient denies urinary frequency, hesitancy, dysuria  Denies personal or family history of prostate cancer  Review of Systems   Constitutional: Negative for chills and fever  HENT: Negative for congestion, rhinorrhea and sinus pressure  Respiratory: Negative for chest tightness, shortness of breath and wheezing  Cardiovascular: Negative for chest pain and palpitations  Gastrointestinal: Negative for abdominal pain, nausea and vomiting  Endocrine: Negative for polyuria  Genitourinary: Negative for difficulty urinating, dysuria, frequency and urgency  Musculoskeletal: Negative for myalgias  Neurological: Negative for dizziness, syncope and light-headedness  Psychiatric/Behavioral: Negative for dysphoric mood           Objective:    /78 (BP Location: Left arm, Patient Position: Sitting, Cuff Size: Adult)   Pulse 62   Temp (!) 97 4 °F (36 3 °C) (Temporal)   Resp 18   Ht 5' 11" (1 803 m)   Wt 83 5 kg (184 lb)   SpO2 97%   BMI 25 66 kg/m²     Physical Exam  Constitutional:       Appearance: Normal appearance  HENT:      Head: Normocephalic and atraumatic  Eyes:      Conjunctiva/sclera: Conjunctivae normal    Cardiovascular:      Rate and Rhythm: Normal rate and regular rhythm  Pulses: Normal pulses  Heart sounds: Normal heart sounds  Pulmonary:      Effort: Pulmonary effort is normal       Breath sounds: Normal breath sounds  Abdominal:      General: There is no distension  Musculoskeletal:         General: Normal range of motion  Cervical back: Normal range of motion and neck supple  Neurological:      Mental Status: He is alert and oriented to person, place, and time  Psychiatric:         Behavior: Behavior normal          Thought Content:  Thought content normal          Lisa Diaz MD  07/13/22  4:12 PM

## 2022-10-11 PROBLEM — Z12.5 SCREENING PSA (PROSTATE SPECIFIC ANTIGEN): Status: RESOLVED | Noted: 2022-06-10 | Resolved: 2022-10-11

## 2023-01-13 ENCOUNTER — HOSPITAL ENCOUNTER (EMERGENCY)
Facility: HOSPITAL | Age: 48
Discharge: HOME | End: 2023-01-13
Attending: EMERGENCY MEDICINE

## 2023-01-13 VITALS
BODY MASS INDEX: 25.52 KG/M2 | OXYGEN SATURATION: 97 % | SYSTOLIC BLOOD PRESSURE: 114 MMHG | RESPIRATION RATE: 16 BRPM | WEIGHT: 182.98 LBS | TEMPERATURE: 97.6 F | HEART RATE: 72 BPM | DIASTOLIC BLOOD PRESSURE: 66 MMHG

## 2023-01-13 DIAGNOSIS — J06.9 VIRAL URI: Primary | ICD-10-CM

## 2023-01-13 LAB
FLUAV RNA RESP QL NAA+PROBE: NEGATIVE
FLUBV RNA RESP QL NAA+PROBE: NEGATIVE
RSV RNA RESP QL NAA+PROBE: NEGATIVE
SARS-COV-2 RNA RESP QL NAA+PROBE: NEGATIVE

## 2023-01-13 RX ORDER — ONDANSETRON 4 MG/1
4 TABLET, ORALLY DISINTEGRATING ORAL ONCE
Status: COMPLETED | OUTPATIENT
Start: 2023-01-13 | End: 2023-01-13

## 2023-01-13 RX ORDER — ACETAMINOPHEN 325 MG/1
650 TABLET ORAL ONCE
Status: COMPLETED | OUTPATIENT
Start: 2023-01-13 | End: 2023-01-13

## 2023-01-13 RX ADMIN — ONDANSETRON 4 MG: 4 TABLET, ORALLY DISINTEGRATING ORAL at 08:51

## 2023-01-13 RX ADMIN — ACETAMINOPHEN 650 MG: 325 TABLET ORAL at 08:51

## 2023-01-13 NOTE — ED PROVIDER NOTES
History  Chief Complaint   Patient presents with   • Nausea     Chills, headache, and nausea x 5 days     Amrik Mujica is a 52year old male with no significant PMH who presented to the ED today with complaints of nausea, chills, cough, diaphoresis with 5-day onset  He endorses 1 sick contact but denies no recent travel  He denies vomiting, abdominal pain, hemoptysis, chest pain/tightness, SOB, and rash  Prior to Admission Medications   Prescriptions Last Dose Informant Patient Reported? Taking?   acetaminophen (TYLENOL) 500 mg tablet   No No   Sig: Take 1 tablet (500 mg total) by mouth every 6 (six) hours as needed for mild pain   aluminum-magnesium hydroxide-simethicone (MAALOX) 200-200-20 MG/5ML SUSP   No No   Sig: Take 15 mL by mouth 4 (four) times a day (before meals and at bedtime)   fluticasone (FLONASE) 50 mcg/act nasal spray   No No   Si spray into each nostril in the morning  ibuprofen (MOTRIN) 400 mg tablet   No No   Sig: Take 1 tablet (400 mg total) by mouth every 6 (six) hours as needed for mild pain   ondansetron (ZOFRAN-ODT) 4 mg disintegrating tablet   No No   Sig: Take 1 tablet (4 mg total) by mouth every 8 (eight) hours as needed for nausea for up to 10 days      Facility-Administered Medications: None       Past Medical History:   Diagnosis Date   • Encounter for medical examination to establish care 6/10/2022   • Ganglion cyst of finger of right hand 6/10/2022       Past Surgical History:   Procedure Laterality Date   • NO PAST SURGERIES         History reviewed  No pertinent family history  I have reviewed and agree with the history as documented      E-Cigarette/Vaping   • E-Cigarette Use Never User      E-Cigarette/Vaping Substances     Social History     Tobacco Use   • Smoking status: Never   • Smokeless tobacco: Never   Vaping Use   • Vaping Use: Never used   Substance Use Topics   • Alcohol use: Yes     Comment: socially   • Drug use: No        Review of Systems Constitutional: Positive for chills, diaphoresis and fever  Respiratory: Positive for cough  Negative for chest tightness, shortness of breath and wheezing  Cardiovascular: Negative for palpitations  Gastrointestinal: Positive for nausea  Negative for abdominal pain and vomiting  Genitourinary: Negative for flank pain and frequency  Skin: Negative  Neurological: Positive for headaches  Hematological: Negative  Psychiatric/Behavioral: Negative  Physical Exam  ED Triage Vitals [01/13/23 0823]   Temperature Pulse Respirations Blood Pressure SpO2   97 6 °F (36 4 °C) 72 16 114/66 97 %      Temp Source Heart Rate Source Patient Position - Orthostatic VS BP Location FiO2 (%)   Oral Monitor Sitting Left arm --      Pain Score       --             Orthostatic Vital Signs  Vitals:    01/13/23 0823   BP: 114/66   Pulse: 72   Patient Position - Orthostatic VS: Sitting       Physical Exam  Vitals reviewed  Constitutional:       General: He is not in acute distress  Appearance: Normal appearance  He is not ill-appearing, toxic-appearing or diaphoretic  HENT:      Head: Normocephalic  Right Ear: External ear normal       Left Ear: External ear normal       Nose: No congestion or rhinorrhea  Mouth/Throat:      Mouth: Mucous membranes are moist    Eyes:      Extraocular Movements: Extraocular movements intact  Cardiovascular:      Rate and Rhythm: Normal rate and regular rhythm  Pulses: Normal pulses  Heart sounds: No murmur heard  Pulmonary:      Effort: Pulmonary effort is normal       Breath sounds: Normal breath sounds  No wheezing  Chest:      Chest wall: No tenderness  Abdominal:      General: Bowel sounds are normal       Palpations: Abdomen is soft  Tenderness: There is no abdominal tenderness  There is no right CVA tenderness or left CVA tenderness  Musculoskeletal:         General: No tenderness  Normal range of motion  Right lower leg: No edema  Left lower leg: No edema  Skin:     General: Skin is warm  Capillary Refill: Capillary refill takes less than 2 seconds  Findings: No rash  Neurological:      General: No focal deficit present  Mental Status: He is alert and oriented to person, place, and time  Psychiatric:         Mood and Affect: Mood normal          Behavior: Behavior normal          ED Medications  Medications   ondansetron (ZOFRAN-ODT) dispersible tablet 4 mg (4 mg Oral Given 1/13/23 0851)   acetaminophen (TYLENOL) tablet 650 mg (650 mg Oral Given 1/13/23 0851)       Diagnostic Studies  Results Reviewed     Procedure Component Value Units Date/Time    FLU/RSV/COVID - if FLU/RSV clinically relevant [[id number]] Collected: 01/13/23 0849    Lab Status: In process Specimen: Nares from Nose Updated: 01/13/23 0851                 No orders to display         Procedures  Procedures      ED Course                             SBIRT 20yo+    Flowsheet Row Most Recent Value   SBIRT (23 yo +)    In order to provide better care to our patients, we are screening all of our patients for alcohol and drug use  Would it be okay to ask you these screening questions? Unable to answer at this time Filed at: 01/13/2023 5621                Medical Decision Making  Patient presented to the ED with complaints of nausea, chills, cough and headaches for the past 5 days  Unremarkable PE; likely viral URI  Will get COVID/flu/RSV nasal swab and treat nausea/headache  Will call patient with results  Will discharge home with recommendations to follow-up with PCP outpatient  Viral URI: acute illness or injury  Risk  OTC drugs  Prescription drug management        Critical Care  Total time providing critical care: 30-74 minutes        Disposition  Final diagnoses:   Viral URI     Time reflects when diagnosis was documented in both MDM as applicable and the Disposition within this note     Time User Action Codes Description Comment    1/13/2023  8:51 AM Albina Castillo Add [J06 9] Viral URI       ED Disposition     ED Disposition   Discharge    Condition   Good    Date/Time   Fri Jan 13, 2023  9:00 AM    Comment   Sanket León discharge to home/self care  Follow-up Information     Follow up With Specialties Details Why Contact Info Additional [address] [facility] Family Medicine Schedule an appointment as soon as possible for a visit in 2 days  [address], [address] [facility] [id number]  [address] [place] [address], [address], [address], [address]          Patient's Medications   Discharge Prescriptions    No medications on file     No discharge procedures on file  PDMP Review     None           ED Provider  Attending physically available and evaluated Sanket León I managed the patient along with the ED Attending      Electronically Signed by         Kermit Joseph MD  01/13/23 5138

## 2023-01-13 NOTE — DISCHARGE INSTRUCTIONS
Please follow up with your pcp within the week if symptoms persist or worsen  Return to the ED if you have difficulty breathing and coughing up green/yellow sputum, or have chest pain/tightness

## 2023-01-13 NOTE — ED ATTENDING ATTESTATION
Kiarra Poon MD, saw and evaluated the patient  I have discussed the patient with the resident and agree with the resident's findings, Plan of Care, and MDM as documented in the resident's note, except where noted  All available labs and Radiology studies were reviewed  I was present for key portions of any procedure(s) performed by the resident and I was immediately available to provide assistance  At this point I agree with the current assessment done in the Emergency Department  I have conducted an independent evaluation of this patient a history and physical is as follows:    53 yo male with no significant past medical history presents to the ED complaining of URI symptoms x 5 days  The patient reports nausea, chills, a mild headache, and a nonproductive cough since Sunday  All of the symptoms are now spontaneously improving and he says he feels "pretty good"  No chest pain, shortness of breath, wheezing, vomiting, or diarrhea  (+) One sick contact with similar symptoms  No recent travel  He denies sore throat  No earache  No other specific complaints  ROS: per resident physician note    Gen: NAD, AA&Ox3  HEENT: PERRL, EOMI  Throat: no tonsillar swelling or erythema, uvula midline, no PTA  Neck: supple, no cervical lymphadenopathy  CV: RRR  Lungs: CTA B/L  Abdomen: soft, NT/ND  Ext: no swelling or deformity  Neuro: 5/5 strength all extremities, sensation grossly intact  Skin: no rash    ED Course  The patient is very well appearing with stable vital signs and a benign exam  All of his symptoms are mostly resolved  Presentation is most consistent with a viral illness, spontaneously improving  Plan for viral testing and supportive care  The patient was instructed to follow up with his PCP next week for reassessment  He is agreeable to this plan  Strict return precautions provided        Critical Care Time  Procedures

## 2023-01-14 ENCOUNTER — APPOINTMENT (OUTPATIENT)
Dept: LAB | Facility: HOSPITAL | Age: 48
End: 2023-01-14

## 2023-04-27 NOTE — PROGRESS NOTES
Assessment/Plan:    Encounter for medical examination to establish care  -CRC screening: No personal or family history of colon cancer or colon polyps  Referral to GI for colonoscopy as per CDC guidelines  -CVS screening: Patient denies any exertional chest pain, dyspnea, palpitations, syncope, orthopnea, edema or paroxysmal nocturnal dyspnea  -DM screening: No polyuria, polydipsia, blurry vision, chest pain, dyspnea or claudication  No foot burning, numbness or pain   -No personal or family history of skin cancers or melanoma  Ganglion cyst of finger of right hand  With onset one year prior - patient reports intermittent pain with certain activities   Patient works as a  and reports repetitive motion of wrists throughout the day    - Counseled patient on prognosis and benign nature of diagnosis  - Will consider Diclofenac 75 mg BID PRN with meals if symptoms worsen - patient deferred for now   - Will consider referral to hand surgeon for removal if symptoms persist or worsen       Return in about 2 weeks (around 6/24/2022) for irritbility, reflux, back and shoulder pain 40 min visit  Diagnoses and all orders for this visit:    Encounter for medical examination to establish care    Screening for HIV (human immunodeficiency virus)  -     HIV 1/2 Antigen/Antibody (4th Generation) w Reflex [facility]; Future    Need for hepatitis C screening test  -     Hepatitis C Antibody ([facility]); Future    Screen for colon cancer  -     Ambulatory Referral to Gastroenterology; Future    Screening for diabetes mellitus  -     Hemoglobin A1C; Future  -     Basic metabolic panel; Future    Screening cholesterol level  -     Lipid Panel with Direct LDL reflex; Future          Subjective:     Tank Newell is a 52 y o  male who  has a past medical history of Known health problems: none  who presented to the office today to establish care      HPI    PMH: None   Medications: Tylenol for shoulder and back pain  Allergies: N/A  Surg Hx: N/A  Social Hx: Alcohol occasionally, denies tobacco and illicit drug use  Sexually active with one partner in a monogamous relationship  Fam Hx: Denies    Current Concerns: shoulder and back pain, lump on hand, reflux      Review of Systems   Constitutional: Negative for chills and fever  HENT: Negative for congestion, rhinorrhea and sinus pressure  Respiratory: Negative for chest tightness, shortness of breath and wheezing  Cardiovascular: Negative for chest pain and palpitations  Gastrointestinal: Negative for abdominal pain, nausea and vomiting  Endocrine: Negative for polyuria  Genitourinary: Negative for difficulty urinating, dysuria, frequency and urgency  Musculoskeletal: Negative for myalgias  Neurological: Negative for dizziness, syncope and light-headedness  Psychiatric/Behavioral: Negative for dysphoric mood  Objective:    /74 (BP Location: Right arm, Patient Position: Sitting, Cuff Size: Standard)   Pulse 89   Temp 98 3 °F (36 8 °C) (Temporal)   Resp 18   Ht 5' 11" (1 803 m)   Wt 82 kg (180 lb 12 8 oz)   SpO2 96%   BMI 25 22 kg/m²     Physical Exam  Constitutional:       Appearance: Normal appearance  HENT:      Head: Normocephalic and atraumatic  Eyes:      Conjunctiva/sclera: Conjunctivae normal    Cardiovascular:      Rate and Rhythm: Normal rate and regular rhythm  Pulses: Normal pulses  Heart sounds: Normal heart sounds  Pulmonary:      Effort: Pulmonary effort is normal       Breath sounds: Normal breath sounds  Abdominal:      General: There is no distension  Musculoskeletal:         General: Deformity (2 cm subcutaneous, smooth, dorsal cyst located between the flexor carpi radialis tendon and the radial artery) present  Normal range of motion  Cervical back: Normal range of motion and neck supple  Neurological:      Mental Status: He is alert and oriented to person, place, and time     Psychiatric: Behavior: Behavior normal          Thought Content:  Thought content normal          Gurdeep Rodriguez MD  06/10/22  8:35 PM Mastoid Interpolation Flap Text: A decision was made to reconstruct the defect utilizing an interpolation axial flap and a staged reconstruction.  A telfa template was made of the defect.  This telfa template was then used to outline the mastoid interpolation flap.  The donor area for the pedicle flap was then injected with anesthesia.  The flap was excised through the skin and subcutaneous tissue down to the layer of the underlying musculature.  The pedicle flap was carefully excised within this deep plane to maintain its blood supply.  The edges of the donor site were undermined.   The donor site was closed in a primary fashion.  The pedicle was then rotated into position and sutured.  Once the tube was sutured into place, adequate blood supply was confirmed with blanching and refill.  The pedicle was then wrapped with xeroform gauze and dressed appropriately with a telfa and gauze bandage to ensure continued blood supply and protect the attached pedicle.

## 2024-03-22 ENCOUNTER — HOSPITAL ENCOUNTER (EMERGENCY)
Facility: HOSPITAL | Age: 49
Discharge: HOME | End: 2024-03-22
Attending: EMERGENCY MEDICINE | Admitting: EMERGENCY MEDICINE
Payer: COMMERCIAL

## 2024-03-22 VITALS
TEMPERATURE: 97.5 F | SYSTOLIC BLOOD PRESSURE: 128 MMHG | RESPIRATION RATE: 20 BRPM | BODY MASS INDEX: 25 KG/M2 | HEART RATE: 52 BPM | DIASTOLIC BLOOD PRESSURE: 80 MMHG | WEIGHT: 179.23 LBS | OXYGEN SATURATION: 99 %

## 2024-03-22 DIAGNOSIS — S39.012A STRAIN OF LUMBAR REGION, INITIAL ENCOUNTER: Primary | ICD-10-CM

## 2024-03-22 LAB
BILIRUB UR QL STRIP: NEGATIVE
CLARITY UR: CLEAR
COLOR UR: YELLOW
GLUCOSE UR STRIP-MCNC: NEGATIVE MG/DL
HGB UR QL STRIP.AUTO: NEGATIVE
KETONES UR STRIP-MCNC: NEGATIVE MG/DL
LEUKOCYTE ESTERASE UR QL STRIP: NEGATIVE
NITRITE UR QL STRIP: NEGATIVE
PH UR STRIP.AUTO: 6 [PH]
PROT UR STRIP-MCNC: NEGATIVE MG/DL
SP GR UR STRIP.AUTO: 1.02 (ref 1–1.04)
UROBILINOGEN UA: NEGATIVE MG/DL

## 2024-03-22 PROCEDURE — 99283 EMERGENCY DEPT VISIT LOW MDM: CPT

## 2024-03-22 PROCEDURE — 81003 URINALYSIS AUTO W/O SCOPE: CPT

## 2024-03-22 RX ORDER — LIDOCAINE 50 MG/G
1 PATCH TOPICAL ONCE
Status: DISCONTINUED | OUTPATIENT
Start: 2024-03-22 | End: 2024-03-22 | Stop reason: HOSPADM

## 2024-03-22 RX ORDER — IBUPROFEN 600 MG/1
600 TABLET ORAL ONCE
Status: COMPLETED | OUTPATIENT
Start: 2024-03-22 | End: 2024-03-22

## 2024-03-22 RX ORDER — NAPROXEN 375 MG/1
375 TABLET ORAL 2 TIMES DAILY WITH MEALS
Qty: 20 TABLET | Refills: 0 | Status: SHIPPED | OUTPATIENT
Start: 2024-03-22

## 2024-03-22 RX ORDER — ACETAMINOPHEN 325 MG/1
975 TABLET ORAL ONCE
Status: COMPLETED | OUTPATIENT
Start: 2024-03-22 | End: 2024-03-22

## 2024-03-22 RX ADMIN — ACETAMINOPHEN 975 MG: 325 TABLET ORAL at 10:35

## 2024-03-22 RX ADMIN — IBUPROFEN 600 MG: 600 TABLET ORAL at 10:35

## 2024-03-22 RX ADMIN — LIDOCAINE 1 PATCH: 50 PATCH CUTANEOUS at 10:36

## 2024-03-22 NOTE — ED PROVIDER NOTES
History  Chief Complaint   Patient presents with    Back Pain     Right side lower back pain with movement.      Patient is a 49-year-old male with no significant PMH presents to the emergency department with right-sided lower back pain present since yesterday afternoon.  He reports that he was doing some heavy lifting yesterday but did not experience pain initially after this lifting.   Patient denies any red flag symptoms of back pain such as bowel or bladder incontinence, urinary retention, fevers, history of IV drug use, or direct injury to the back.  He reports pain is worse with movement including twisting and raising the right leg.  He reports pain comes and goes and is not always present when moving.  He denies blood in the urine or dysuria.  He does report that over the last several years he urinates currently but also drinks a lot of water.  He has otherwise felt well.          Prior to Admission Medications   Prescriptions Last Dose Informant Patient Reported? Taking?   acetaminophen (TYLENOL) 500 mg tablet Not Taking  No No   Sig: Take 1 tablet (500 mg total) by mouth every 6 (six) hours as needed for mild pain   Patient not taking: Reported on 3/22/2024   aluminum-magnesium hydroxide-simethicone (MAALOX) 200-200-20 MG/5ML SUSP Not Taking  No No   Sig: Take 15 mL by mouth 4 (four) times a day (before meals and at bedtime)   Patient not taking: Reported on 3/22/2024   fluticasone (FLONASE) 50 mcg/act nasal spray Not Taking  No No   Si spray into each nostril in the morning.   Patient not taking: Reported on 3/22/2024   ibuprofen (MOTRIN) 400 mg tablet Not Taking  No No   Sig: Take 1 tablet (400 mg total) by mouth every 6 (six) hours as needed for mild pain   Patient not taking: Reported on 3/22/2024   ondansetron (ZOFRAN-ODT) 4 mg disintegrating tablet   No No   Sig: Take 1 tablet (4 mg total) by mouth every 8 (eight) hours as needed for nausea for up to 10 days      Facility-Administered Medications:  None       Past Medical History:   Diagnosis Date    Encounter for medical examination to establish care 6/10/2022    Ganglion cyst of finger of right hand 6/10/2022       Past Surgical History:   Procedure Laterality Date    NO PAST SURGERIES         History reviewed. No pertinent family history.  I have reviewed and agree with the history as documented.    E-Cigarette/Vaping    E-Cigarette Use Never User      E-Cigarette/Vaping Substances     Social History     Tobacco Use    Smoking status: Never    Smokeless tobacco: Never   Vaping Use    Vaping status: Never Used   Substance Use Topics    Alcohol use: Yes     Comment: socially    Drug use: No        Review of Systems   Constitutional:  Negative for chills and fever.   Respiratory:  Negative for cough and shortness of breath.    Cardiovascular:  Negative for chest pain and palpitations.   Gastrointestinal:  Negative for abdominal pain, nausea and vomiting.   Genitourinary:  Negative for difficulty urinating, dysuria and hematuria.   Musculoskeletal:  Positive for back pain. Negative for arthralgias.   Skin:  Negative for color change and rash.   Neurological:  Negative for seizures and syncope.   All other systems reviewed and are negative.      Physical Exam  ED Triage Vitals   Temperature Pulse Respirations Blood Pressure SpO2   03/22/24 1007 03/22/24 1007 03/22/24 1007 03/22/24 1007 03/22/24 [facility]   97.5 °F (36.4 °C) (!) 52 20 128/80 99 %      Temp Source Heart Rate Source Patient Position - Orthostatic VS BP Location FiO2 (%)   03/22/24 1007 03/22/24 1007 03/22/24 1007 03/22/24 [facility] --   Tympanic Monitor Lying Left arm       Pain Score       03/22/24 1035       10 - Worst Possible Pain             Orthostatic Vital Signs  Vitals:    03/22/24 [facility]   BP: 128/80   Pulse: (!) 52   Patient Position - Orthostatic VS: Lying       Physical Exam  Vitals and nursing note reviewed.   Constitutional:       General: He is not in acute distress.     Appearance: He is  well-developed. He is not ill-appearing.   HENT:      Head: Normocephalic and atraumatic.   Eyes:      Extraocular Movements: Extraocular movements intact.      Conjunctiva/sclera: Conjunctivae normal.   Cardiovascular:      Rate and Rhythm: Normal rate and regular rhythm.      Pulses: Normal pulses.      Heart sounds: Normal heart sounds. No murmur heard.  Pulmonary:      Effort: Pulmonary effort is normal. No respiratory distress.      Breath sounds: Normal breath sounds. No wheezing, rhonchi or rales.   Abdominal:      General: Abdomen is flat.      Palpations: Abdomen is soft.      Tenderness: There is no abdominal tenderness. There is no guarding or rebound.   Musculoskeletal:         General: Tenderness (mild, R lower back) present. No swelling or deformity. Normal range of motion.      Cervical back: Normal range of motion and neck supple.      Right lower leg: No edema.      Left lower leg: No edema.   Skin:     General: Skin is warm and dry.      Capillary Refill: Capillary refill takes less than 2 seconds.   Neurological:      Mental Status: He is alert.         ED Medications  Medications   lidocaine (LIDODERM) 5 % patch 1 patch (1 patch Topical Medication Applied 3/22/24 1036)   acetaminophen (TYLENOL) tablet 975 mg (975 mg Oral Given 3/22/24 1035)   ibuprofen (MOTRIN) tablet 600 mg (600 mg Oral Given 3/22/24 1035)       Diagnostic Studies  Results Reviewed       Procedure Component Value Units Date/Time    UA w Reflex to Microscopic w Reflex to Culture [[id number]]  (Normal) Collected: 03/22/24 1036    Lab Status: Final result Specimen: Urine, Clean Catch Updated: 03/22/24 1048     Color, UA Yellow     Clarity, UA Clear     Specific Gravity, UA 1.020     pH, UA 6.0     Leukocytes, UA Negative     Nitrite, UA Negative     Protein, UA Negative mg/dl      Glucose, UA Negative mg/dl      Ketones, UA Negative mg/dl      Bilirubin, UA Negative     Occult Blood, UA Negative     UROBILINOGEN UA Negative mg/dL                     No orders to display         Procedures  Procedures      ED Course                             SBIRT 22yo+      Flowsheet Row Most Recent Value   Initial Alcohol Screen: US AUDIT-C     1. How often do you have a drink containing alcohol? 0 Filed at: 03/22/2024 1028   2. How many drinks containing alcohol do you have on a typical day you are drinking?  0 Filed at: 03/22/2024 1028   3a. Male UNDER 65: How often do you have five or more drinks on one occasion? 0 Filed at: 03/22/2024 1028   Audit-C Score 0 Filed at: 03/22/2024 1028   DEB: How many times in the past year have you...    Used an illegal drug or used a prescription medication for non-medical reasons? Never Filed at: 03/22/2024 1028                  Medical Decision Making  49M here with right lower back pain present since yesterday afternoon.  Patient was lifting heavy items in the morning yesterday.  On exam, patient has mild tenderness to palpation of the right lower back.  No CVA tenderness.  No abdominal tenderness.  No urgency, dysuria, hematuria.  No history of renal stones.  Pain is worse with movement.    Patient denies any red flag symptoms of back pain such as bowel or bladder incontinence, urinary retention, fevers, history of IV drug use, or direct injury to the back -doubt cauda equina syndrome, spinal epidural abscess, or other need for emergent MRI.    Urinalysis reveals no RBCs, WBCs, or nitrites.  Low suspicion for urinary tract infection or renal stone.  Suspect musculoskeletal pain-will prescribe short course of NSAIDs and recommend patient follow-up with PCP if symptoms do not improve.    In the absence of additional concerning findings on physical exam or laboratory evaluation patient is appropriate for discharge at this time.  Patient provided with informational handout that discusses symptom management and reasons to return to the emergency department.  Return precautions are discussed and the patient and/or family  demonstrate understanding of the plan.  Their questions are all answered to their satisfaction and the patient is discharged.          Risk  OTC drugs.  Prescription drug management.          Disposition  Final diagnoses:   Strain of lumbar region, initial encounter     Time reflects when diagnosis was documented in both MDM as applicable and the Disposition within this note       Time User Action Codes Description Comment    3/22/2024 10:37 AM Richar Sheehan [S39.012A] Strain of lumbar region, initial encounter           ED Disposition       ED Disposition   Discharge    Condition   Stable    Date/Time   Fri Mar 22, 2024 1050    Comment   Sanket León discharge to home/self care.                   Follow-up Information       Follow up With Specialties Details Why Contact Info Additional Information    [facility] Medicine Schedule an appointment as soon as possible for a visit   [address] [place] [id number] [phone] [facility], [address] [place] [address], [place], [place], [address] [phone]            Patient's Medications   Discharge Prescriptions    NAPROXEN (NAPROSYN) 375 MG TABLET    Take 1 tablet (375 mg total) by mouth 2 (two) times a day with meals       Start Date: 3/22/2024 End Date: --       Order Dose: 375 mg       Quantity: 20 tablet    Refills: 0     No discharge procedures on file.    PDMP Review       None             ED Provider  Attending physically available and evaluated Sanket León. I managed the patient along with the ED Attending.    Electronically Signed by           Richar Sheehan DO  03/22/24 8716

## 2024-03-22 NOTE — ED ATTENDING ATTESTATION
3/22/2024  I, Dirk Townsend MD, saw and evaluated the patient. I have discussed the patient with the resident/non-physician practitioner and agree with the resident's/non-physician practitioner's findings, Plan of Care, and MDM as documented in the resident's/non-physician practitioner's note, except where noted. All available labs and Radiology studies were reviewed.  I was present for key portions of any procedure(s) performed by the resident/non-physician practitioner and I was immediately available to provide assistance.       At this point I agree with the current assessment done in the Emergency Department.  I have conducted an independent evaluation of this patient a history and physical is as follows:    With the patient complaining of right-sided low back pain for 2 days does remember doing some heavy lifting denies any dysuria hematuria no weakness no extremity no abdominal pain  Exam mild right lower back paraspinous discomfort straight legs neurological exam is unremarkable urinalysis was negative for blood  Clinically not AAA or kidney stone patient is felt safe for discharge musculoskeletal pain  ED Course           Critical Care Time  Procedures

## 2024-06-06 ENCOUNTER — HOSPITAL ENCOUNTER (EMERGENCY)
Facility: HOSPITAL | Age: 49
Discharge: HOME | End: 2024-06-06
Attending: EMERGENCY MEDICINE
Payer: COMMERCIAL

## 2024-06-06 VITALS
WEIGHT: 178.4 LBS | DIASTOLIC BLOOD PRESSURE: 77 MMHG | RESPIRATION RATE: 16 BRPM | BODY MASS INDEX: 24.88 KG/M2 | SYSTOLIC BLOOD PRESSURE: 116 MMHG | HEART RATE: 59 BPM | OXYGEN SATURATION: 98 % | TEMPERATURE: 98.4 F

## 2024-06-06 DIAGNOSIS — H10.9 CONJUNCTIVITIS: Primary | ICD-10-CM

## 2024-06-06 PROCEDURE — 99282 EMERGENCY DEPT VISIT SF MDM: CPT

## 2024-06-06 PROCEDURE — 99284 EMERGENCY DEPT VISIT MOD MDM: CPT | Performed by: EMERGENCY MEDICINE

## 2024-06-06 RX ORDER — ERYTHROMYCIN 5 MG/G
OINTMENT OPHTHALMIC
Qty: 3.5 G | Refills: 0 | Status: SHIPPED | OUTPATIENT
Start: 2024-06-06

## 2024-06-06 NOTE — ED PROVIDER NOTES
History  Chief Complaint   Patient presents with    Eye Redness     Bilat for a couple of days. He was prescribed eye drops 2 days ago, not working.       HPI  Patient is a 49-year-old male with no significant past medical history presenting with bilateral eye redness.  States that it is itchy.  Denies any vision change.  Reports it felt very crusty on the eyelids and his eyelids were shot when he woke up yesterday.  Symptoms been ongoing since yesterday.  Reports no fever, chills, nausea, vomiting, diarrhea.  Denies any pain with extraocular movement.    Prior to Admission Medications   Prescriptions Last Dose Informant Patient Reported? Taking?   acetaminophen (TYLENOL) 500 mg tablet   No No   Sig: Take 1 tablet (500 mg total) by mouth every 6 (six) hours as needed for mild pain   Patient not taking: Reported on 3/22/2024   aluminum-magnesium hydroxide-simethicone (MAALOX) 200-200-20 MG/5ML SUSP   No No   Sig: Take 15 mL by mouth 4 (four) times a day (before meals and at bedtime)   Patient not taking: Reported on 3/22/2024   fluticasone (FLONASE) 50 mcg/act nasal spray   No No   Si spray into each nostril in the morning.   Patient not taking: Reported on 3/22/2024   ibuprofen (MOTRIN) 400 mg tablet   No No   Sig: Take 1 tablet (400 mg total) by mouth every 6 (six) hours as needed for mild pain   Patient not taking: Reported on 3/22/2024   naproxen (NAPROSYN) 375 mg tablet   No No   Sig: Take 1 tablet (375 mg total) by mouth 2 (two) times a day with meals   ondansetron (ZOFRAN-ODT) 4 mg disintegrating tablet   No No   Sig: Take 1 tablet (4 mg total) by mouth every 8 (eight) hours as needed for nausea for up to 10 days      Facility-Administered Medications: None       Past Medical History:   Diagnosis Date    Encounter for medical examination to establish care 6/10/2022    Ganglion cyst of finger of right hand 6/10/2022       Past Surgical History:   Procedure Laterality Date    NO PAST SURGERIES          History reviewed. No pertinent family history.  I have reviewed and agree with the history as documented.    E-Cigarette/Vaping    E-Cigarette Use Never User      E-Cigarette/Vaping Substances     Social History     Tobacco Use    Smoking status: Never    Smokeless tobacco: Never   Vaping Use    Vaping status: Never Used   Substance Use Topics    Alcohol use: Yes     Comment: socially    Drug use: No       Review of Systems   Constitutional:  Negative for chills, diaphoresis, fever and unexpected weight change.   HENT:  Negative for ear pain and sore throat.    Eyes:  Positive for redness and itching. Negative for visual disturbance.   Respiratory:  Negative for cough, chest tightness and shortness of breath.    Cardiovascular:  Negative for chest pain and leg swelling.   Gastrointestinal:  Negative for abdominal distention, abdominal pain, constipation, diarrhea, nausea and vomiting.   Endocrine: Negative.    Genitourinary:  Negative for difficulty urinating and dysuria.   Musculoskeletal: Negative.    Skin: Negative.    Allergic/Immunologic: Negative.    Neurological: Negative.    Hematological: Negative.    Psychiatric/Behavioral: Negative.     All other systems reviewed and are negative.      Physical Exam  Physical Exam  Vitals and nursing note reviewed.   Constitutional:       General: He is not in acute distress.     Appearance: Normal appearance. He is not ill-appearing.   HENT:      Head: Normocephalic and atraumatic.      Right Ear: External ear normal.      Left Ear: External ear normal.      Nose: Nose normal.      Mouth/Throat:      Mouth: Mucous membranes are moist.      Pharynx: Oropharynx is clear.   Eyes:      General: No scleral icterus.        Right eye: No discharge.         Left eye: No discharge.      Extraocular Movements: Extraocular movements intact.      Conjunctiva/sclera:      Right eye: Right conjunctiva is injected.      Left eye: Left conjunctiva is injected.      Pupils: Pupils  are equal, round, and reactive to light.   Cardiovascular:      Rate and Rhythm: Normal rate and regular rhythm.      Pulses: Normal pulses.      Heart sounds: Normal heart sounds.   Pulmonary:      Effort: Pulmonary effort is normal.      Breath sounds: Normal breath sounds.   Abdominal:      General: Abdomen is flat. Bowel sounds are normal. There is no distension.      Palpations: Abdomen is soft.      Tenderness: There is no abdominal tenderness. There is no guarding or rebound.   Musculoskeletal:         General: Normal range of motion.      Cervical back: Normal range of motion and neck supple.   Skin:     General: Skin is warm and dry.      Capillary Refill: Capillary refill takes less than 2 seconds.   Neurological:      General: No focal deficit present.      Mental Status: He is alert and oriented to person, place, and time. Mental status is at baseline.   Psychiatric:         Mood and Affect: Mood normal.         Behavior: Behavior normal.         Thought Content: Thought content normal.         Judgment: Judgment normal.         Vital Signs  ED Triage Vitals   Temperature Pulse Respirations Blood Pressure SpO2   06/06/24 0912 06/06/24 0912 06/06/24 0912 06/06/24 0912 06/06/24 0912   98.4 °F (36.9 °C) 59 16 116/77 98 %      Temp Source Heart Rate Source Patient Position - Orthostatic VS BP Location FiO2 (%)   06/06/24 0912 06/06/24 0912 06/06/24 0912 06/06/24 0912 --   Oral Monitor Lying Left arm       Pain Score       06/06/24 0941       No Pain           Vitals:    06/06/24 0912   BP: 116/77   Pulse: 59   Patient Position - Orthostatic VS: Lying         Visual Acuity  Visual Acuity      Flowsheet Row Most Recent Value   Visual acuity R eye is 20/40   Visual acuity Left eye is 20/40   Visual acuity in both eyes is 20/40   Wearing corrective eyewear/lenses? Yes            ED Medications  Medications - No data to display    Diagnostic Studies  Results Reviewed       None                   No orders to  display              Procedures  Procedures         ED Course                               SBIRT 22yo+      Flowsheet Row Most Recent Value   Initial Alcohol Screen: US AUDIT-C     1. How often do you have a drink containing alcohol? 0 Filed at: 06/06/2024 0926   2. How many drinks containing alcohol do you have on a typical day you are drinking?  0 Filed at: 06/06/2024 0926   3a. Male UNDER 65: How often do you have five or more drinks on one occasion? 0 Filed at: 06/06/2024 0926   3b. FEMALE Any Age, or MALE 65+: How often do you have 4 or more drinks on one occassion? 0 Filed at: 06/06/2024 0926   Audit-C Score 0 Filed at: 06/06/2024 0926   DEB: How many times in the past year have you...    Used an illegal drug or used a prescription medication for non-medical reasons? Never Filed at: 06/06/2024 0926                      Medical Decision Making  49-year-old male presents with bilateral eye redness  Patient with bilateral conjunctivitis  Will treat with antibiotics   No signs of preseptal cellulitis or orbital cellulitis  No signs of orbital trauma  20/40 vision OD, OS and OU    Problems Addressed:  Conjunctivitis: acute illness or injury    Risk  Prescription drug management.             Disposition  Final diagnoses:   Conjunctivitis     Time reflects when diagnosis was documented in both MDM as applicable and the Disposition within this note       Time User Action Codes Description Comment    6/6/2024  9:24 AM Buster Quintero Add [H10.9] Conjunctivitis           ED Disposition       ED Disposition   Discharge    Condition   Stable    Date/Time   Thu Jun 6, 2024 0924    Comment   Sanket León discharge to home/self care.                   Follow-up Information       Follow up With Specialties Details Why Contact Info Additional Information    [facility] Schedule an appointment as soon as possible for a visit   [address] [place] [address]  [phone] [facility] [place], [address], [address], [place], [place], [id number] [phone]            Discharge Medication List as of 6/6/2024  9:24 AM        START taking these medications    Details   erythromycin (ILOTYCIN) ophthalmic ointment Place a 1/2 inch ribbon of ointment into the lower eyelid., Normal           CONTINUE these medications which have NOT CHANGED    Details   acetaminophen (TYLENOL) 500 mg tablet Take 1 tablet (500 mg total) by mouth every 6 (six) hours as needed for mild pain, Starting Thu 5/12/2022, Normal      aluminum-magnesium hydroxide-simethicone (MAALOX) 200-200-20 MG/5ML SUSP Take 15 mL by mouth 4 (four) times a day (before meals and at bedtime), Starting Thu 5/12/2022, Normal      fluticasone (FLONASE) 50 mcg/act nasal spray 1 spray into each nostril in the morning., Starting Thu 5/12/2022, Normal      ibuprofen (MOTRIN) 400 mg tablet Take 1 tablet (400 mg total) by mouth every 6 (six) hours as needed for mild pain, Starting u 5/12/2022, Normal      naproxen (NAPROSYN) 375 mg tablet Take 1 tablet (375 mg total) by mouth 2 (two) times a day with meals, Starting Fri 3/22/2024, Normal      ondansetron (ZOFRAN-ODT) 4 mg disintegrating tablet Take 1 tablet (4 mg total) by mouth every 8 (eight) hours as needed for nausea for up to 10 days, Starting u 5/12/2022, Until Sun 5/22/2022 at 2359, Normal             No discharge procedures on file.    PDMP Review       None            ED Provider  Electronically Signed by             Buster Quintero MD  06/06/24 7437

## 2025-04-19 ENCOUNTER — HOSPITAL ENCOUNTER (EMERGENCY)
Facility: HOSPITAL | Age: 50
Discharge: HOME | End: 2025-04-19
Attending: EMERGENCY MEDICINE
Payer: COMMERCIAL

## 2025-04-19 ENCOUNTER — APPOINTMENT (EMERGENCY)
Dept: CT IMAGING | Facility: HOSPITAL | Age: 50
End: 2025-04-19
Payer: COMMERCIAL

## 2025-04-19 VITALS
HEART RATE: 62 BPM | BODY MASS INDEX: 24.69 KG/M2 | DIASTOLIC BLOOD PRESSURE: 75 MMHG | RESPIRATION RATE: 14 BRPM | WEIGHT: 177.03 LBS | OXYGEN SATURATION: 96 % | TEMPERATURE: 98.2 F | SYSTOLIC BLOOD PRESSURE: 124 MMHG

## 2025-04-19 DIAGNOSIS — R10.9 RIGHT FLANK PAIN: Primary | ICD-10-CM

## 2025-04-19 DIAGNOSIS — M54.50 LOW BACK PAIN: ICD-10-CM

## 2025-04-19 LAB
ALBUMIN SERPL BCG-MCNC: 4.2 G/DL (ref 3.5–5)
ALP SERPL-CCNC: 60 U/L (ref 34–104)
ALT SERPL W P-5'-P-CCNC: 29 U/L (ref 7–52)
ANION GAP SERPL CALCULATED.3IONS-SCNC: 6 MMOL/L (ref 4–13)
AST SERPL W P-5'-P-CCNC: 23 U/L (ref 13–39)
BACTERIA UR QL AUTO: ABNORMAL /HPF
BASOPHILS # BLD AUTO: 0.07 THOUSANDS/ÂΜL (ref 0–0.1)
BASOPHILS NFR BLD AUTO: 1 % (ref 0–1)
BILIRUB SERPL-MCNC: 0.68 MG/DL (ref 0.2–1)
BILIRUB UR QL STRIP: NEGATIVE
BUN SERPL-MCNC: 14 MG/DL (ref 5–25)
CALCIUM SERPL-MCNC: 9 MG/DL (ref 8.4–10.2)
CHLORIDE SERPL-SCNC: 105 MMOL/L (ref 96–108)
CLARITY UR: CLEAR
CO2 SERPL-SCNC: 26 MMOL/L (ref 21–32)
COLOR UR: ABNORMAL
CREAT SERPL-MCNC: 0.83 MG/DL (ref 0.6–1.3)
EOSINOPHIL # BLD AUTO: 0.18 THOUSAND/ÂΜL (ref 0–0.61)
EOSINOPHIL NFR BLD AUTO: 2 % (ref 0–6)
ERYTHROCYTE [DISTWIDTH] IN BLOOD BY AUTOMATED COUNT: 12.9 % (ref 11.6–15.1)
GFR SERPL CREATININE-BSD FRML MDRD: 102 ML/MIN/1.73SQ M
GLUCOSE SERPL-MCNC: 87 MG/DL (ref 65–140)
GLUCOSE UR STRIP-MCNC: NEGATIVE MG/DL
HCT VFR BLD AUTO: 46.5 % (ref 36.5–49.3)
HGB BLD-MCNC: 17 G/DL (ref 12–17)
HGB UR QL STRIP.AUTO: NEGATIVE
IMM GRANULOCYTES # BLD AUTO: 0.02 THOUSAND/UL (ref 0–0.2)
IMM GRANULOCYTES NFR BLD AUTO: 0 % (ref 0–2)
KETONES UR STRIP-MCNC: NEGATIVE MG/DL
LEUKOCYTE ESTERASE UR QL STRIP: NEGATIVE
LIPASE SERPL-CCNC: 13 U/L (ref 11–82)
LYMPHOCYTES # BLD AUTO: 2.76 THOUSANDS/ÂΜL (ref 0.6–4.47)
LYMPHOCYTES NFR BLD AUTO: 37 % (ref 14–44)
MCH RBC QN AUTO: 31.3 PG (ref 26.8–34.3)
MCHC RBC AUTO-ENTMCNC: 36.6 G/DL (ref 31.4–37.4)
MCV RBC AUTO: 86 FL (ref 82–98)
MONOCYTES # BLD AUTO: 0.71 THOUSAND/ÂΜL (ref 0.17–1.22)
MONOCYTES NFR BLD AUTO: 10 % (ref 4–12)
MUCOUS THREADS UR QL AUTO: ABNORMAL
NEUTROPHILS # BLD AUTO: 3.76 THOUSANDS/ÂΜL (ref 1.85–7.62)
NEUTS SEG NFR BLD AUTO: 50 % (ref 43–75)
NITRITE UR QL STRIP: NEGATIVE
NON-SQ EPI CELLS URNS QL MICRO: ABNORMAL /HPF
NRBC BLD AUTO-RTO: 0 /100 WBCS
PH UR STRIP.AUTO: 6 [PH]
PLATELET # BLD AUTO: 198 THOUSANDS/UL (ref 149–390)
PMV BLD AUTO: 9.7 FL (ref 8.9–12.7)
POTASSIUM SERPL-SCNC: 4 MMOL/L (ref 3.5–5.3)
PROT SERPL-MCNC: 7.4 G/DL (ref 6.4–8.4)
PROT UR STRIP-MCNC: ABNORMAL MG/DL
RBC # BLD AUTO: 5.43 MILLION/UL (ref 3.88–5.62)
RBC #/AREA URNS AUTO: ABNORMAL /HPF
SODIUM SERPL-SCNC: 137 MMOL/L (ref 135–147)
SP GR UR STRIP.AUTO: 1.02 (ref 1–1.04)
UROBILINOGEN UA: NEGATIVE MG/DL
WBC # BLD AUTO: 7.5 THOUSAND/UL (ref 4.31–10.16)
WBC #/AREA URNS AUTO: ABNORMAL /HPF

## 2025-04-19 PROCEDURE — 74177 CT ABD & PELVIS W/CONTRAST: CPT

## 2025-04-19 PROCEDURE — 99285 EMERGENCY DEPT VISIT HI MDM: CPT | Performed by: EMERGENCY MEDICINE

## 2025-04-19 PROCEDURE — 81001 URINALYSIS AUTO W/SCOPE: CPT | Performed by: EMERGENCY MEDICINE

## 2025-04-19 PROCEDURE — 96374 THER/PROPH/DIAG INJ IV PUSH: CPT

## 2025-04-19 PROCEDURE — 99283 EMERGENCY DEPT VISIT LOW MDM: CPT

## 2025-04-19 PROCEDURE — 83690 ASSAY OF LIPASE: CPT | Performed by: EMERGENCY MEDICINE

## 2025-04-19 PROCEDURE — 81003 URINALYSIS AUTO W/O SCOPE: CPT | Performed by: EMERGENCY MEDICINE

## 2025-04-19 PROCEDURE — 36415 COLL VENOUS BLD VENIPUNCTURE: CPT | Performed by: EMERGENCY MEDICINE

## 2025-04-19 PROCEDURE — 80053 COMPREHEN METABOLIC PANEL: CPT | Performed by: EMERGENCY MEDICINE

## 2025-04-19 PROCEDURE — 85025 COMPLETE CBC W/AUTO DIFF WBC: CPT | Performed by: EMERGENCY MEDICINE

## 2025-04-19 RX ORDER — LIDOCAINE 4 G/G
1 PATCH TOPICAL DAILY
Qty: 6 PATCH | Refills: 0 | Status: SHIPPED | OUTPATIENT
Start: 2025-04-19 | End: 2025-04-25

## 2025-04-19 RX ORDER — LIDOCAINE 50 MG/G
1 PATCH TOPICAL ONCE
Status: DISCONTINUED | OUTPATIENT
Start: 2025-04-19 | End: 2025-04-19 | Stop reason: HOSPADM

## 2025-04-19 RX ORDER — KETOROLAC TROMETHAMINE 30 MG/ML
15 INJECTION, SOLUTION INTRAMUSCULAR; INTRAVENOUS ONCE
Status: COMPLETED | OUTPATIENT
Start: 2025-04-19 | End: 2025-04-19

## 2025-04-19 RX ORDER — NAPROXEN 500 MG/1
500 TABLET ORAL 2 TIMES DAILY WITH MEALS
Qty: 14 TABLET | Refills: 0 | Status: SHIPPED | OUTPATIENT
Start: 2025-04-19 | End: 2025-04-26

## 2025-04-19 RX ADMIN — KETOROLAC TROMETHAMINE 15 MG: 30 INJECTION, SOLUTION INTRAMUSCULAR; INTRAVENOUS at 12:00

## 2025-04-19 RX ADMIN — LIDOCAINE 5% 1 PATCH: 700 PATCH TOPICAL at 12:01

## 2025-04-19 RX ADMIN — IOHEXOL 100 ML: 350 INJECTION, SOLUTION INTRAVENOUS at 12:35

## 2025-04-19 NOTE — DISCHARGE INSTRUCTIONS
Your evaluation today reveals no dangerous causes of your flank pain such as kidney stones or kidney problems, or any problems such as appendicitis. Please take Naproxen for pain as needed and use lidocaine patches to help with the pain. Follow up with the Family Medicine clinic for re-evaluation of your symptoms.   The CT scan of your back does show some arthritis changes of L5 and S1 vertebral body. This may cause some lower back pain.    [place] evaluación de hoy no revela causas peligrosas de faust dolor en el costado, logan cálculos renales o problemas renales, o cualquier problema logan apendicitis. [place] naproxeno para el dolor según sea necesario y use parches de lidocaína para ayudar con el dolor. [place] un seguimiento con la clínica de [facility] para desi reevaluación de claudine síntomas.  La tomografía computarizada de la espalda muestra algunos cambios en la artritis del cuerpo vertebral L5 y S1. [place] puede causar algo de dolor en la parte baja de la espalda.

## 2025-04-19 NOTE — ED PROVIDER NOTES
Time reflects when diagnosis was documented in both MDM as applicable and the Disposition within this note       Time User Action Codes Description Comment    4/19/2025  1:17 PM Guillermina Doyle [R10.9] Right flank pain     4/19/2025  1:18 PM Guillermina Doyle Add [M54.50] Low back pain           ED Disposition       ED Disposition   Discharge    Condition   Stable    Date/Time   Sat Apr 19, 2025  1:17 PM    Comment   Sanket León discharge to home/self care.                   Assessment & Plan       Medical Decision Making  50-year-old male presenting with right flank pain for the past 3 days.  Vital signs reviewed, within normal limits.  Differential diagnosis includes musculoskeletal etiology, nephrolithiasis, urinary tract infection, appendicitis, versus another etiology of symptoms.  Plan for labs, CT imaging of the abdomen pelvis, as well as symptomatic treatment.  Labs are reassuring, patient's pain is improved after Toradol.  CT imaging reveals no evidence of pyelonephritis or nephrolithiasis or other acute intra-abdominal pathology.  There is a note made of mild bladder irregularity, however, patient's UA is not consistent with UTI.  Patient discharged to home with recommendations for symptom control, return precautions, and plan for follow up.    Problems Addressed:  Low back pain: acute illness or injury  Right flank pain: acute illness or injury    Amount and/or Complexity of Data Reviewed  Labs: ordered. Decision-making details documented in ED Course.  Radiology: ordered. Decision-making details documented in ED Course.    Risk  OTC drugs.  Prescription drug management.             Medications   ketorolac (TORADOL) injection 15 mg (15 mg Intravenous Given 4/19/25 1200)   iohexol (OMNIPAQUE) 350 MG/ML injection (MULTI-DOSE) 100 mL (100 mL Intravenous Given 4/19/25 1235)       ED Risk Strat Scores                    No data recorded                            History of Present Illness       Chief  Complaint   Patient presents with    Back Pain     Back pain that radiates to abdomen and legs for past 3 days. States he took tylenol at about 0800 this morning       Past Medical History:   Diagnosis Date    Encounter for medical examination to establish care 6/10/2022    Ganglion cyst of finger of right hand 6/10/2022      Past Surgical History:   Procedure Laterality Date    NO PAST SURGERIES        History reviewed. No pertinent family history.   Social History     Tobacco Use    Smoking status: Never    Smokeless tobacco: Never   Vaping Use    Vaping status: Never Used   Substance Use Topics    Alcohol use: Yes     Comment: socially    Drug use: No      E-Cigarette/Vaping    E-Cigarette Use Never User       E-Cigarette/Vaping Substances      I have reviewed and agree with the history as documented.     50-year-old male without significant past medical history presenting with right flank pain.  Over the past 3 days, patient has had pain in his right back and right flank.  No trauma.  Has nausea this morning, no vomiting, no diarrhea.  No dysuria or blood in urine.  Reports that he has had similar symptoms previously, however, has not had these worked up.  Has taken Tylenol without much relief.   assisted with interpretation via interpretation aileen.        Review of Systems   Respiratory:  Negative for shortness of breath.    Cardiovascular:  Negative for chest pain.   Gastrointestinal:  Positive for abdominal pain and nausea. Negative for diarrhea and vomiting.   Genitourinary:  Negative for dysuria and hematuria.   All other systems reviewed and are negative.          Objective       ED Triage Vitals   Temperature Pulse Blood Pressure Respirations SpO2 Patient Position - Orthostatic VS   04/19/25 1124 04/19/25 1124 04/19/25 1124 04/19/25 1124 04/19/25 1124 04/19/25 1124   98.2 °F (36.8 °C) 62 124/75 14 96 % Sitting      Temp Source Heart Rate Source BP Location FiO2 (%) Pain Score    04/19/25  1124 04/19/25 1124 04/19/25 1124 -- 04/19/25 1200    Oral Monitor Left arm  8      Vitals      Date and Time Temp Pulse SpO2 Resp BP Pain Score FACES Pain Rating User   04/19/25 1200 -- -- -- -- -- 8 -- SB   04/19/25 1124 98.2 °F (36.8 °C) 62 96 % 14 124/75 -- -- MB            Physical Exam  ED Triage Vitals   Temperature Pulse Respirations Blood Pressure SpO2   04/19/25 1124 04/19/25 1124 04/19/25 1124 04/19/25 1124 04/19/25 1124   98.2 °F (36.8 °C) 62 14 124/75 96 %      Temp Source Heart Rate Source Patient Position - Orthostatic VS BP Location FiO2 (%)   04/19/25 1124 04/19/25 1124 04/19/25 1124 04/19/25 1124 --   Oral Monitor Sitting Left arm       Pain Score       04/19/25 1200       8           Vital signs and nursing notes reviewed    CONSTITUTIONAL: male appearing stated age resting in bed, in no acute distress  HEENT: atraumatic, normocephalic. Sclera anicteric, conjunctiva are not injected. Moist oral mucosa  CARDIOVASCULAR/CHEST: RRR, no M/R/G. 2+ radial pulses  PULMONARY: Breathing comfortably on RA. Breath sounds are equal and clear to auscultation  ABDOMEN: non-distended. BS present, normoactive.  Tender to palpation in right flank and right periumbilical region, no rebound or guarding.  MSK: moves all extremities, no deformities, no peripheral edema, no calf asymmetry  NEURO: Awake, alert, and oriented x 3. Face symmetric. Moves all extremities spontaneously. No focal neurologic deficits  SKIN: Warm, appears well-perfused  MENTAL STATUS: Normal affect      Results Reviewed       Procedure Component Value Units Date/Time    Urine Microscopic [[id number]]  (Abnormal) Collected: 04/19/25 1157    Lab Status: Final result Specimen: Urine, Clean Catch Updated: 04/19/25 1226     RBC, UA None Seen /hpf      WBC, UA None Seen /hpf      Epithelial Cells Occasional /hpf      Bacteria, UA Occasional /hpf      MUCUS THREADS Occasional    Comprehensive metabolic panel [[id number]] Collected: 04/19/25 1157    Lab  Status: Final result Specimen: Blood from Arm, Left Updated: 04/19/25 1223     Sodium 137 mmol/L      Potassium 4.0 mmol/L      Chloride 105 mmol/L      CO2 26 mmol/L      ANION GAP 6 mmol/L      BUN 14 mg/dL      Creatinine 0.83 mg/dL      Glucose 87 mg/dL      Calcium 9.0 mg/dL      AST 23 U/L      ALT 29 U/L      Alkaline Phosphatase 60 U/L      Total Protein 7.4 g/dL      Albumin 4.2 g/dL      Total Bilirubin 0.68 mg/dL      eGFR 102 ml/min/1.73sq m     Narrative:      National Kidney Disease Foundation guidelines for Chronic Kidney Disease (CKD):     Stage 1 with normal or high GFR (GFR > 90 mL/min/1.73 square meters)    Stage 2 Mild CKD (GFR = 60-89 mL/min/1.73 square meters)    Stage 3A Moderate CKD (GFR = 45-59 mL/min/1.73 square meters)    Stage 3B Moderate CKD (GFR = 30-44 mL/min/1.73 square meters)    Stage 4 Severe CKD (GFR = 15-29 mL/min/1.73 square meters)    Stage 5 End Stage CKD (GFR <15 mL/min/1.73 square meters)  Note: GFR calculation is accurate only with a steady state creatinine    Lipase [[id number]]  (Normal) Collected: 04/19/25 1157    Lab Status: Final result Specimen: Blood from Arm, Left Updated: 04/19/25 1223     Lipase 13 u/L     UA w Reflex to Microscopic w Reflex to Culture [[id number]]  (Abnormal) Collected: 04/19/25 1157    Lab Status: Final result Specimen: Urine, Clean Catch Updated: 04/19/25 1211     Color, UA Sachi     Clarity, UA Clear     Specific Gravity, UA 1.020     pH, UA 6.0     Leukocytes, UA Negative     Nitrite, UA Negative     Protein, UA 15 (Trace) mg/dl      Glucose, UA Negative mg/dl      Ketones, UA Negative mg/dl      Bilirubin, UA Negative     Occult Blood, UA Negative     UROBILINOGEN UA Negative mg/dL     CBC and differential [[id number]] Collected: 04/19/25 1157    Lab Status: Final result Specimen: Blood from Arm, Left Updated: 04/19/25 1208     WBC 7.50 Thousand/uL      RBC 5.43 Million/uL      Hemoglobin 17.0 g/dL      Hematocrit 46.5 %      MCV 86 fL      MCH  31.3 pg      MCHC 36.6 g/dL      RDW 12.9 %      MPV 9.7 fL      Platelets 198 Thousands/uL      nRBC 0 /100 WBCs      Segmented % 50 %      Immature Grans % 0 %      Lymphocytes % 37 %      Monocytes % 10 %      Eosinophils Relative 2 %      Basophils Relative 1 %      Absolute Neutrophils 3.76 Thousands/µL      Absolute Immature Grans 0.02 Thousand/uL      Absolute Lymphocytes 2.76 Thousands/µL      Absolute Monocytes 0.71 Thousand/µL      Eosinophils Absolute 0.18 Thousand/µL      Basophils Absolute 0.07 Thousands/µL             CT abdomen pelvis with contrast   Final Interpretation by Jj Carrington MD ([id number])      1.  Mild bladder wall irregularity. Correlate with urinalysis for possible cystitis.   2.  No additional acute findings with a normal appendix identified.         Workstation performed: [id number]             Procedures    ED Medication and Procedure Management   Prior to Admission Medications   Prescriptions Last Dose Informant Patient Reported? Taking?   acetaminophen (TYLENOL) 500 mg tablet   No No   Sig: Take 1 tablet (500 mg total) by mouth every 6 (six) hours as needed for mild pain   Patient not taking: Reported on 3/22/2024   aluminum-magnesium hydroxide-simethicone (MAALOX) 200-200-20 MG/5ML SUSP   No No   Sig: Take 15 mL by mouth 4 (four) times a day (before meals and at bedtime)   Patient not taking: Reported on 3/22/2024   erythromycin (ILOTYCIN) ophthalmic ointment   No No   Sig: Place a 1/2 inch ribbon of ointment into the lower eyelid.   fluticasone (FLONASE) 50 mcg/act nasal spray   No No   Si spray into each nostril in the morning.   Patient not taking: Reported on 3/22/2024   ibuprofen (MOTRIN) 400 mg tablet   No No   Sig: Take 1 tablet (400 mg total) by mouth every 6 (six) hours as needed for mild pain   Patient not taking: Reported on 3/22/2024   ondansetron (ZOFRAN-ODT) 4 mg disintegrating tablet   No No   Sig: Take 1 tablet (4 mg total) by mouth every 8 (eight) hours as  needed for nausea for up to 10 days      Facility-Administered Medications: None     Discharge Medication List as of 4/19/2025  1:42 PM        START taking these medications    Details   Lidocaine 4 % PTCH Apply 1 patch topically daily for 6 days, Starting Sat 4/19/2025, Until Fri 4/25/2025, Normal      naproxen (Naprosyn) 500 mg tablet Take 1 tablet (500 mg total) by mouth 2 (two) times a day with meals for 7 days, Starting Sat 4/19/2025, Until Sat 4/26/2025, Normal           CONTINUE these medications which have NOT CHANGED    Details   acetaminophen (TYLENOL) 500 mg tablet Take 1 tablet (500 mg total) by mouth every 6 (six) hours as needed for mild pain, Starting u 5/12/2022, Normal      aluminum-magnesium hydroxide-simethicone (MAALOX) 200-200-20 MG/5ML SUSP Take 15 mL by mouth 4 (four) times a day (before meals and at bedtime), Starting u 5/12/2022, Normal      erythromycin (ILOTYCIN) ophthalmic ointment Place a 1/2 inch ribbon of ointment into the lower eyelid., Normal      fluticasone (FLONASE) 50 mcg/act nasal spray 1 spray into each nostril in the morning., Starting u 5/12/2022, Normal      ibuprofen (MOTRIN) 400 mg tablet Take 1 tablet (400 mg total) by mouth every 6 (six) hours as needed for mild pain, Starting Thu 5/12/2022, Normal      ondansetron (ZOFRAN-ODT) 4 mg disintegrating tablet Take 1 tablet (4 mg total) by mouth every 8 (eight) hours as needed for nausea for up to 10 days, Starting u 5/12/2022, Until Sun 5/22/2022 at 2359, Normal             ED SEPSIS DOCUMENTATION   Time reflects when diagnosis was documented in both MDM as applicable and the Disposition within this note       Time User Action Codes Description Comment    4/19/2025  1:17 PM Guillermina Doyle [R10.9] Right flank pain     4/19/2025  1:18 PM Guillermina Doyle [M54.50] Low back pain                  Guillermina Doyle MD  04/19/25 1231       Guillermina Doyle MD  04/20/25 1253